# Patient Record
Sex: MALE | Race: WHITE | Employment: OTHER | URBAN - METROPOLITAN AREA
[De-identification: names, ages, dates, MRNs, and addresses within clinical notes are randomized per-mention and may not be internally consistent; named-entity substitution may affect disease eponyms.]

---

## 2017-03-07 ENCOUNTER — HOSPITAL ENCOUNTER (OUTPATIENT)
Dept: CT IMAGING | Age: 57
Discharge: HOME | End: 2017-03-07
Attending: INTERNAL MEDICINE
Payer: COMMERCIAL

## 2017-03-07 DIAGNOSIS — R91.1 PULMONARY NODULE: ICD-10-CM

## 2017-03-07 PROCEDURE — 71250 CT THORAX DX C-: CPT

## 2017-04-18 ENCOUNTER — TELEPHONE (OUTPATIENT)
Dept: GASTROENTEROLOGY | Facility: CLINIC | Age: 57
End: 2017-04-18

## 2017-04-18 RX ORDER — OMEPRAZOLE 40 MG/1
40 CAPSULE, DELAYED RELEASE ORAL DAILY
Qty: 30 CAPSULE | Refills: 0 | Status: SHIPPED | OUTPATIENT
Start: 2017-04-18 | End: 2017-04-27

## 2017-04-18 NOTE — TELEPHONE ENCOUNTER
Last OV 3/17/2016  Last refill 3/17/2016    Pt has appt scheduled on 4/27/17 @10:45am.   Please advise, thank you.

## 2017-04-18 NOTE — TELEPHONE ENCOUNTER
Current Outpatient Prescriptions:                                      Omeprazole 40 MG Oral Capsule Delayed Release Take 1 capsule (40 mg total) by mouth daily.  Disp: 30 capsule Rfl: 11                         Pt is out of med -needs refill until appt o

## 2017-04-18 NOTE — TELEPHONE ENCOUNTER
Nursing: I have refilled omeprazole 40 mg daily for 1 month (#30, 0 refills). The patient may have additional refills after he sees Dr. Sonia Sawyer for his annual visit.

## 2017-04-27 ENCOUNTER — OFFICE VISIT (OUTPATIENT)
Dept: GASTROENTEROLOGY | Facility: CLINIC | Age: 57
End: 2017-04-27

## 2017-04-27 VITALS
SYSTOLIC BLOOD PRESSURE: 120 MMHG | TEMPERATURE: 98 F | HEART RATE: 80 BPM | HEIGHT: 69 IN | BODY MASS INDEX: 29.92 KG/M2 | WEIGHT: 202 LBS | DIASTOLIC BLOOD PRESSURE: 84 MMHG | RESPIRATION RATE: 16 BRPM

## 2017-04-27 DIAGNOSIS — Z86.010 HISTORY OF COLON POLYPS: ICD-10-CM

## 2017-04-27 DIAGNOSIS — K21.9 GASTROESOPHAGEAL REFLUX DISEASE WITHOUT ESOPHAGITIS: Primary | ICD-10-CM

## 2017-04-27 PROCEDURE — 99213 OFFICE O/P EST LOW 20 MIN: CPT | Performed by: INTERNAL MEDICINE

## 2017-04-27 PROCEDURE — 99212 OFFICE O/P EST SF 10 MIN: CPT | Performed by: INTERNAL MEDICINE

## 2017-04-27 RX ORDER — OMEPRAZOLE 40 MG/1
40 CAPSULE, DELAYED RELEASE ORAL DAILY
Qty: 30 CAPSULE | Refills: 11 | Status: SHIPPED | OUTPATIENT
Start: 2017-04-27 | End: 2018-05-25

## 2017-04-27 NOTE — PROGRESS NOTES
HPI:    Patient ID: Leoncio Mcfadden is a 60year old male. HPI  Heidy Never returns in follow-up. He was last seen in March 2016. As per previous notes he has a history of endoscopy negative gastroesophageal reflux (2003) controlled with PPI therapy.   He als above    Wt Readings from Last 6 Encounters:  04/27/17 : 202 lb (91.627 kg)  12/17/16 : 196 lb (88.905 kg)  06/27/16 : 202 lb (91.627 kg)  03/17/16 : 207 lb 9.6 oz (94.167 kg)  01/19/15 : 204 lb 9.6 oz (92.806 kg)  11/19/12 : 202 lb 12.8 oz (91.989 kg) Lymphadenopathy:     He has no cervical adenopathy. Neurological: He is alert and oriented to person, place, and time. Psychiatric: He has a normal mood and affect. His behavior is normal.   Nursing note and vitals reviewed.       PROCEDURE: US ABDOME He will continue calcium/magnesium supplementation. He may take Pepcid AC as needed. Follow-up office visit in 1 year or sooner if issues arise. 2. History of colon polyps  The patient is up-to-date with colonoscopic surveillance.   He is due for a col

## 2017-04-28 NOTE — PATIENT INSTRUCTIONS
1.  Continue omeprazole. 2.  Follow-up office visit in 1 year. 3.  You are due for a colonoscopy in December 2018.

## 2017-05-02 ENCOUNTER — OFFICE VISIT (OUTPATIENT)
Dept: PULMONOLOGY | Facility: CLINIC | Age: 57
End: 2017-05-02

## 2017-05-02 VITALS
OXYGEN SATURATION: 99 % | BODY MASS INDEX: 29.47 KG/M2 | RESPIRATION RATE: 18 BRPM | DIASTOLIC BLOOD PRESSURE: 80 MMHG | HEIGHT: 69 IN | WEIGHT: 199 LBS | HEART RATE: 69 BPM | SYSTOLIC BLOOD PRESSURE: 126 MMHG

## 2017-05-02 DIAGNOSIS — R91.8 PULMONARY NODULES: Primary | ICD-10-CM

## 2017-05-02 PROCEDURE — 99212 OFFICE O/P EST SF 10 MIN: CPT | Performed by: INTERNAL MEDICINE

## 2017-05-02 PROCEDURE — 99243 OFF/OP CNSLTJ NEW/EST LOW 30: CPT | Performed by: INTERNAL MEDICINE

## 2017-05-02 NOTE — PROGRESS NOTES
Dear Sheryl Fleischer:           As you know, Enrico Flores is a 57-year-old male who I am now evaluating for pulmonary nodules. HISTORY OF PRESENT ILLNESS: The patient is a lifetime non-smoker. He worked as a  for over 20 years.   He did have some smoke inha without hepatosplenomegaly and no mass appreciable. Extremities without clubbing cyanosis nor edema. Neurologic grossly intact with symmetric tone and strength and reflex.     LABORATORY: CT scan of the chest March 7, 2017– multiple tiny pulmonary nodules 3

## 2017-06-05 ENCOUNTER — HOSPITAL ENCOUNTER (OUTPATIENT)
Age: 57
Discharge: HOME | End: 2017-06-05
Payer: COMMERCIAL

## 2017-06-05 VITALS
TEMPERATURE: 98 F | WEIGHT: 195 LBS | DIASTOLIC BLOOD PRESSURE: 81 MMHG | HEIGHT: 69 IN | RESPIRATION RATE: 15 BRPM | HEART RATE: 75 BPM | OXYGEN SATURATION: 99 % | SYSTOLIC BLOOD PRESSURE: 132 MMHG | BODY MASS INDEX: 28.88 KG/M2

## 2017-06-05 DIAGNOSIS — S60.459A FINGER, SUPERFICIAL FOREIGN BODY (SPLINTER), INITIAL ENCOUNTER: Primary | ICD-10-CM

## 2017-06-05 PROCEDURE — 99214 OFFICE O/P EST MOD 30 MIN: CPT

## 2017-06-05 PROCEDURE — 99213 OFFICE O/P EST LOW 20 MIN: CPT

## 2017-06-05 RX ORDER — CEPHALEXIN 500 MG/1
500 CAPSULE ORAL 3 TIMES DAILY
Qty: 30 CAPSULE | Refills: 0 | Status: SHIPPED | OUTPATIENT
Start: 2017-06-05 | End: 2017-06-15

## 2017-06-05 NOTE — ED PROVIDER NOTES
Patient Seen in: [address]    History   Patient presents with:  Finger Pain    Stated Complaint: RT THUMB SWELLING     HPI    Patient is a 53-year-old male who presents for evaluation of possible right thumb retained sli • Other[other] [OTHER] Maternal Grandmother    • Other[other] [OTHER] Maternal Grandfather    • Other[other] [OTHER] Paternal Grandmother    • Other[other] [OTHER] Paternal Grandfather          Smoking Status: Never Smoker                      Smokeless St Vitals stable, patient appears nontoxic. Physical exam as above. I do not feel it is necessary to do a digital block and wound exploration at this time.   Had a lengthy discussion with patient regarding foreign bodies in the skin and said it will likely w

## 2017-06-05 NOTE — ED INITIAL ASSESSMENT (HPI)
PATIENT ARRIVED AMBULATORY TO ROOM ONE. PATIENT STATES HE GOT A \"WOODEN SLIVER\" TO THE RIGHT THUMB. PATIENT STATES \"I PULLED IT OUT BUT I FEEL LIKE THERE IS STILL A LITTLE SOMETHING IN THERE THAT I CANT GET OUT\" SMALL PUNCTURE WOUND NOTED TO THUMB.  NO

## 2017-07-05 ENCOUNTER — LAB REQUISITION (OUTPATIENT)
Dept: LAB | Facility: HOSPITAL | Age: 57
End: 2017-07-05
Payer: COMMERCIAL

## 2017-07-05 DIAGNOSIS — L82.0 INFLAMED SEBORRHEIC KERATOSIS: ICD-10-CM

## 2017-07-05 DIAGNOSIS — L57.0 ACTINIC KERATOSIS: ICD-10-CM

## 2017-07-05 DIAGNOSIS — D48.5 NEOPLASM OF UNCERTAIN BEHAVIOR OF SKIN: ICD-10-CM

## 2017-07-05 DIAGNOSIS — R23.9 SKIN CHANGE: ICD-10-CM

## 2017-07-05 PROCEDURE — 88305 TISSUE EXAM BY PATHOLOGIST: CPT | Performed by: PLASTIC SURGERY

## 2017-08-06 ENCOUNTER — APPOINTMENT (OUTPATIENT)
Dept: GENERAL RADIOLOGY | Age: 57
End: 2017-08-06
Attending: NURSE PRACTITIONER
Payer: COMMERCIAL

## 2017-08-06 ENCOUNTER — HOSPITAL ENCOUNTER (OUTPATIENT)
Age: 57
Discharge: HOME | End: 2017-08-06
Payer: COMMERCIAL

## 2017-08-06 VITALS
HEART RATE: 84 BPM | OXYGEN SATURATION: 98 % | DIASTOLIC BLOOD PRESSURE: 79 MMHG | TEMPERATURE: 98 F | RESPIRATION RATE: 18 BRPM | BODY MASS INDEX: 28.88 KG/M2 | WEIGHT: 195 LBS | HEIGHT: 69 IN | SYSTOLIC BLOOD PRESSURE: 124 MMHG

## 2017-08-06 DIAGNOSIS — S05.91XA RIGHT EYE INJURY, INITIAL ENCOUNTER: Primary | ICD-10-CM

## 2017-08-06 PROCEDURE — 99214 OFFICE O/P EST MOD 30 MIN: CPT

## 2017-08-06 PROCEDURE — 99213 OFFICE O/P EST LOW 20 MIN: CPT

## 2017-08-06 PROCEDURE — 70200 X-RAY EXAM OF EYE SOCKETS: CPT | Performed by: NURSE PRACTITIONER

## 2017-08-06 RX ORDER — ERYTHROMYCIN 5 MG/G
1 OINTMENT OPHTHALMIC EVERY 6 HOURS
Qty: 1 G | Refills: 0 | Status: SHIPPED | OUTPATIENT
Start: 2017-08-06 | End: 2017-08-13

## 2017-08-06 NOTE — ED PROVIDER NOTES
Patient presents with:   Eye Visual Problem (opthalmic)      HPI:     Lachelle Jenkins is a 60year old male who presents today with a chief complaint of injury to the right eye that occurred prior to arrival.  The patient states he was weed whacking and fe ROS:   Positive for stated complaint: eye injury, photophobia. All other systems reviewed and negative except as noted above. Constitutional and Vital Signs Reviewed. Physical Exam:         Physical Exam:    /79   Pulse 84   Temp 98. is aware of this. He is up-to-date with his tetanus shot.  2 drops of tetracaine were placed in the right eye and the eye was stained. There is no stain uptake.   I attempted to touch the C-shaped dark area that may possibly be a foreign body with a Q-tip

## 2017-08-06 NOTE — ED INITIAL ASSESSMENT (HPI)
REPORTS RIGHT EYE REDNESS, STATES HE WAS WAS USING A WEED [employer]FFERSON THIS AM AND \"SOMETHING\" GRAZED HIS EYE. DENIES FOREIGN BODY SENSATION TO RIGHT EYE AT CURRENT TIME. DENIES BLURRED VISION AND DRAINAGE FROM EYE. REPORTS SLIGHT LIGHT SENSITIVITY.

## 2017-08-25 ENCOUNTER — TELEPHONE (OUTPATIENT)
Dept: PULMONOLOGY | Facility: CLINIC | Age: 57
End: 2017-08-25

## 2017-08-25 NOTE — TELEPHONE ENCOUNTER
Letter sent to patient re: CT Chest from Sept CC. [facility] please process prior auth and contact pt. Thank You.

## 2017-09-08 ENCOUNTER — HOSPITAL ENCOUNTER (OUTPATIENT)
Age: 57
Discharge: HOME | End: 2017-09-08
Payer: COMMERCIAL

## 2017-09-08 VITALS
DIASTOLIC BLOOD PRESSURE: 76 MMHG | TEMPERATURE: 98 F | OXYGEN SATURATION: 98 % | RESPIRATION RATE: 20 BRPM | HEIGHT: 69 IN | BODY MASS INDEX: 28.88 KG/M2 | SYSTOLIC BLOOD PRESSURE: 132 MMHG | WEIGHT: 195 LBS | HEART RATE: 65 BPM

## 2017-09-08 DIAGNOSIS — J01.10 ACUTE NON-RECURRENT FRONTAL SINUSITIS: Primary | ICD-10-CM

## 2017-09-08 PROCEDURE — 99213 OFFICE O/P EST LOW 20 MIN: CPT

## 2017-09-08 PROCEDURE — 99214 OFFICE O/P EST MOD 30 MIN: CPT

## 2017-09-08 RX ORDER — AMOXICILLIN AND CLAVULANATE POTASSIUM 875; 125 MG/1; MG/1
1 TABLET, FILM COATED ORAL 2 TIMES DAILY
Qty: 20 TABLET | Refills: 0 | Status: SHIPPED | OUTPATIENT
Start: 2017-09-08 | End: 2017-09-18

## 2017-09-08 NOTE — ED PROVIDER NOTES
Patient presents with:  Cough/URI      HPI:     Debora Benson is a 59year old male who presents with a chief complaint of frontal sinus congestion and discomfort, thick purulent drainage from the nose, and postnasal drip for the past week.   No history None on file         ROS:   Positive for stated complaint: frontal sinus congestion, sinus discomfort, nasal drainage, post nasal drip, chills. All other systems reviewed and negative except as noted above. Constitutional and Vital Signs Reviewed.

## 2017-10-09 ENCOUNTER — HOSPITAL ENCOUNTER (OUTPATIENT)
Dept: CT IMAGING | Age: 57
Discharge: HOME | End: 2017-10-09
Attending: INTERNAL MEDICINE
Payer: COMMERCIAL

## 2017-10-09 DIAGNOSIS — R91.8 PULMONARY NODULES: ICD-10-CM

## 2017-10-09 PROCEDURE — 71250 CT THORAX DX C-: CPT | Performed by: INTERNAL MEDICINE

## 2017-10-20 ENCOUNTER — TELEPHONE (OUTPATIENT)
Dept: PULMONOLOGY | Facility: CLINIC | Age: 57
End: 2017-10-20

## 2017-10-20 DIAGNOSIS — R91.1 PULMONARY NODULE: Primary | ICD-10-CM

## 2017-10-20 NOTE — TELEPHONE ENCOUNTER
----- Message from Val Delgadillo MD sent at 10/15/2017 11:10 AM CDT -----  RN, I spoke with the patient regarding the results of his CT scan the chest.  Please add to the calendar a repeat CT scan of the chest at the 6 month interval.

## 2018-02-12 ENCOUNTER — OFFICE VISIT (OUTPATIENT)
Dept: GASTROENTEROLOGY | Facility: CLINIC | Age: 58
End: 2018-02-12

## 2018-02-12 VITALS
HEIGHT: 68 IN | WEIGHT: 199 LBS | BODY MASS INDEX: 30.16 KG/M2 | DIASTOLIC BLOOD PRESSURE: 78 MMHG | HEART RATE: 68 BPM | SYSTOLIC BLOOD PRESSURE: 120 MMHG

## 2018-02-12 DIAGNOSIS — Z86.010 HISTORY OF COLON POLYPS: ICD-10-CM

## 2018-02-12 DIAGNOSIS — K21.9 GASTROESOPHAGEAL REFLUX DISEASE WITHOUT ESOPHAGITIS: Primary | ICD-10-CM

## 2018-02-12 PROCEDURE — 99212 OFFICE O/P EST SF 10 MIN: CPT | Performed by: INTERNAL MEDICINE

## 2018-02-12 PROCEDURE — 99213 OFFICE O/P EST LOW 20 MIN: CPT | Performed by: INTERNAL MEDICINE

## 2018-02-14 ENCOUNTER — APPOINTMENT (OUTPATIENT)
Dept: CT IMAGING | Facility: HOSPITAL | Age: 58
End: 2018-02-14
Attending: EMERGENCY MEDICINE
Payer: COMMERCIAL

## 2018-02-14 ENCOUNTER — HOSPITAL ENCOUNTER (OUTPATIENT)
Facility: HOSPITAL | Age: 58
Setting detail: OBSERVATION
Discharge: HOME | End: 2018-02-15
Attending: EMERGENCY MEDICINE | Admitting: HOSPITALIST
Payer: COMMERCIAL

## 2018-02-14 DIAGNOSIS — R10.9 ABDOMINAL PAIN, ACUTE: Primary | ICD-10-CM

## 2018-02-14 LAB
ALBUMIN SERPL BCP-MCNC: 4 G/DL (ref 3.5–4.8)
ALP SERPL-CCNC: 53 U/L (ref 32–100)
ALT SERPL-CCNC: 38 U/L (ref 17–63)
ANION GAP SERPL CALC-SCNC: 9 MMOL/L (ref 0–18)
AST SERPL-CCNC: 30 U/L (ref 15–41)
BASOPHILS # BLD: 0 K/UL (ref 0–0.2)
BASOPHILS NFR BLD: 1 %
BILIRUB DIRECT SERPL-MCNC: 0.1 MG/DL (ref 0–0.2)
BILIRUB SERPL-MCNC: 0.7 MG/DL (ref 0.3–1.2)
BILIRUB UR QL: NEGATIVE
BUN SERPL-MCNC: 15 MG/DL (ref 8–20)
BUN/CREAT SERPL: 13.9 (ref 10–20)
CALCIUM SERPL-MCNC: 9 MG/DL (ref 8.5–10.5)
CHLORIDE SERPL-SCNC: 108 MMOL/L (ref 95–110)
CLARITY UR: CLEAR
CO2 SERPL-SCNC: 23 MMOL/L (ref 22–32)
COLOR UR: YELLOW
CREAT SERPL-MCNC: 1.08 MG/DL (ref 0.5–1.5)
EOSINOPHIL # BLD: 0.1 K/UL (ref 0–0.7)
EOSINOPHIL NFR BLD: 2 %
ERYTHROCYTE [DISTWIDTH] IN BLOOD BY AUTOMATED COUNT: 13.3 % (ref 11–15)
GLUCOSE SERPL-MCNC: 109 MG/DL (ref 70–99)
GLUCOSE UR-MCNC: NEGATIVE MG/DL
HCT VFR BLD AUTO: 44.2 % (ref 41–52)
HGB BLD-MCNC: 15.4 G/DL (ref 13.5–17.5)
HGB UR QL STRIP.AUTO: NEGATIVE
KETONES UR-MCNC: NEGATIVE MG/DL
LEUKOCYTE ESTERASE UR QL STRIP.AUTO: NEGATIVE
LYMPHOCYTES # BLD: 2.2 K/UL (ref 1–4)
LYMPHOCYTES NFR BLD: 42 %
MCH RBC QN AUTO: 30.9 PG (ref 27–32)
MCHC RBC AUTO-ENTMCNC: 34.8 G/DL (ref 32–37)
MCV RBC AUTO: 88.9 FL (ref 80–100)
MONOCYTES # BLD: 0.4 K/UL (ref 0–1)
MONOCYTES NFR BLD: 7 %
NEUTROPHILS # BLD AUTO: 2.6 K/UL (ref 1.8–7.7)
NEUTROPHILS NFR BLD: 49 %
NITRITE UR QL STRIP.AUTO: NEGATIVE
OSMOLALITY UR CALC.SUM OF ELEC: 291 MOSM/KG (ref 275–295)
PH UR: 6 [PH] (ref 5–8)
PLATELET # BLD AUTO: 235 K/UL (ref 140–400)
PMV BLD AUTO: 7.7 FL (ref 7.4–10.3)
POTASSIUM SERPL-SCNC: 3.5 MMOL/L (ref 3.3–5.1)
PROT SERPL-MCNC: 6.7 G/DL (ref 5.9–8.4)
PROT UR-MCNC: NEGATIVE MG/DL
RBC # BLD AUTO: 4.97 M/UL (ref 4.5–5.9)
SODIUM SERPL-SCNC: 140 MMOL/L (ref 136–144)
SP GR UR STRIP: 1.02 (ref 1–1.03)
UROBILINOGEN UR STRIP-ACNC: <2
VIT C UR-MCNC: NEGATIVE MG/DL
WBC # BLD AUTO: 5.3 K/UL (ref 4–11)

## 2018-02-14 PROCEDURE — 74176 CT ABD & PELVIS W/O CONTRAST: CPT | Performed by: EMERGENCY MEDICINE

## 2018-02-14 PROCEDURE — 99219 INITIAL OBSERVATION CARE,LEVL II: CPT | Performed by: HOSPITALIST

## 2018-02-14 RX ORDER — MORPHINE SULFATE 2 MG/ML
2 INJECTION, SOLUTION INTRAMUSCULAR; INTRAVENOUS EVERY 2 HOUR PRN
Status: DISCONTINUED | OUTPATIENT
Start: 2018-02-14 | End: 2018-02-15

## 2018-02-14 RX ORDER — KETOROLAC TROMETHAMINE 30 MG/ML
30 INJECTION, SOLUTION INTRAMUSCULAR; INTRAVENOUS ONCE
Status: COMPLETED | OUTPATIENT
Start: 2018-02-14 | End: 2018-02-14

## 2018-02-14 RX ORDER — KETOROLAC TROMETHAMINE 15 MG/ML
15 INJECTION, SOLUTION INTRAMUSCULAR; INTRAVENOUS EVERY 6 HOURS PRN
Status: DISCONTINUED | OUTPATIENT
Start: 2018-02-14 | End: 2018-02-15

## 2018-02-14 RX ORDER — HYDROCODONE BITARTRATE AND ACETAMINOPHEN 5; 325 MG/1; MG/1
2 TABLET ORAL EVERY 4 HOURS PRN
Status: DISCONTINUED | OUTPATIENT
Start: 2018-02-14 | End: 2018-02-15

## 2018-02-14 RX ORDER — ACETAMINOPHEN 325 MG/1
650 TABLET ORAL EVERY 6 HOURS PRN
Status: DISCONTINUED | OUTPATIENT
Start: 2018-02-14 | End: 2018-02-15

## 2018-02-14 RX ORDER — MORPHINE SULFATE 4 MG/ML
4 INJECTION, SOLUTION INTRAMUSCULAR; INTRAVENOUS EVERY 2 HOUR PRN
Status: DISCONTINUED | OUTPATIENT
Start: 2018-02-14 | End: 2018-02-15

## 2018-02-14 RX ORDER — CYCLOBENZAPRINE HCL 10 MG
10 TABLET ORAL 3 TIMES DAILY PRN
Status: DISCONTINUED | OUTPATIENT
Start: 2018-02-14 | End: 2018-02-15

## 2018-02-14 RX ORDER — ACETAMINOPHEN 325 MG/1
650 TABLET ORAL EVERY 4 HOURS PRN
Status: DISCONTINUED | OUTPATIENT
Start: 2018-02-14 | End: 2018-02-15

## 2018-02-14 RX ORDER — MORPHINE SULFATE 2 MG/ML
1 INJECTION, SOLUTION INTRAMUSCULAR; INTRAVENOUS EVERY 2 HOUR PRN
Status: DISCONTINUED | OUTPATIENT
Start: 2018-02-14 | End: 2018-02-15

## 2018-02-14 RX ORDER — HYDROCODONE BITARTRATE AND ACETAMINOPHEN 5; 325 MG/1; MG/1
1 TABLET ORAL EVERY 4 HOURS PRN
Status: DISCONTINUED | OUTPATIENT
Start: 2018-02-14 | End: 2018-02-15

## 2018-02-14 RX ORDER — MORPHINE SULFATE 4 MG/ML
4 INJECTION, SOLUTION INTRAMUSCULAR; INTRAVENOUS ONCE
Status: COMPLETED | OUTPATIENT
Start: 2018-02-14 | End: 2018-02-14

## 2018-02-14 RX ORDER — KETOROLAC TROMETHAMINE 30 MG/ML
30 INJECTION, SOLUTION INTRAMUSCULAR; INTRAVENOUS EVERY 6 HOURS PRN
Status: DISCONTINUED | OUTPATIENT
Start: 2018-02-14 | End: 2018-02-15

## 2018-02-14 RX ORDER — POTASSIUM CHLORIDE 20 MEQ/1
40 TABLET, EXTENDED RELEASE ORAL EVERY 4 HOURS
Status: COMPLETED | OUTPATIENT
Start: 2018-02-14 | End: 2018-02-15

## 2018-02-14 RX ORDER — ONDANSETRON 2 MG/ML
4 INJECTION INTRAMUSCULAR; INTRAVENOUS ONCE
Status: COMPLETED | OUTPATIENT
Start: 2018-02-14 | End: 2018-02-14

## 2018-02-14 RX ORDER — SODIUM CHLORIDE 0.9 % (FLUSH) 0.9 %
3 SYRINGE (ML) INJECTION AS NEEDED
Status: DISCONTINUED | OUTPATIENT
Start: 2018-02-14 | End: 2018-02-15

## 2018-02-14 RX ORDER — ONDANSETRON 2 MG/ML
4 INJECTION INTRAMUSCULAR; INTRAVENOUS EVERY 6 HOURS PRN
Status: DISCONTINUED | OUTPATIENT
Start: 2018-02-14 | End: 2018-02-15

## 2018-02-14 RX ORDER — MORPHINE SULFATE 2 MG/ML
2 INJECTION, SOLUTION INTRAMUSCULAR; INTRAVENOUS ONCE
Status: COMPLETED | OUTPATIENT
Start: 2018-02-14 | End: 2018-02-14

## 2018-02-14 NOTE — ED INITIAL ASSESSMENT (HPI)
approx 1 hour prior to arrival while in the shower developed grabbing type pain \"muscle\" to bilateral lower abdomen with nausea. sts did have one loose stool. Denies vomiting or fever. Pain continues at this time.

## 2018-02-14 NOTE — ED PROVIDER NOTES
Patient Seen in: [facility] AND [facility] Emergency Department    History   Patient presents with:  Abdomen/Flank Pain (GI/)    Stated Complaint:     HPI    Patient is a 52-year-old male presents with sudden onset severe abdominal pain.   Patient describes it distension or masses. BS normal. TTP in RUQ, periumbilical area and LUQ with guarding.  No rebound  Extremities: FROM of all extremities, no cyanosis/clubbing/edema  Neuro: CN intact, normal speech,  5/5 motor strength in all extremities, no focal deficits [place] for observational stay and continued pain meds. Dr Zev Joshua notified but is going out of town. Requests consult go to another surgeon. [facility] uses [facility], will consult with another surgeon.  D/w dr Lico Guajardo, recommends toradol and will

## 2018-02-14 NOTE — H&P
[facility]    PATIENT'S NAME: Rainer Foote   ATTENDING PHYSICIAN: Minda Harada, MD   PATIENT ACCOUNT#:   [id number]    LOCATION:  [place] [address]  MEDICAL RECORD #:   [id number]       YOB: 1960  ADMISSION DATE:       02/14 person. Moderate distress. VITAL SIGNS:  Temperature 97.3, pulse 54, respiratory rate 22, blood pressure 131/81, pulse ox 100% on room air. HEENT:  Atraumatic. Oropharynx clear. Moist mucous membranes. Normal hard and soft palate.    NECK:  Trachea m

## 2018-02-15 VITALS
HEART RATE: 58 BPM | OXYGEN SATURATION: 96 % | DIASTOLIC BLOOD PRESSURE: 76 MMHG | HEIGHT: 69 IN | SYSTOLIC BLOOD PRESSURE: 115 MMHG | RESPIRATION RATE: 18 BRPM | BODY MASS INDEX: 28.14 KG/M2 | TEMPERATURE: 98 F | WEIGHT: 190 LBS

## 2018-02-15 LAB
ANION GAP SERPL CALC-SCNC: 4 MMOL/L (ref 0–18)
BASOPHILS # BLD: 0 K/UL (ref 0–0.2)
BASOPHILS NFR BLD: 0 %
BUN SERPL-MCNC: 11 MG/DL (ref 8–20)
BUN/CREAT SERPL: 11.6 (ref 10–20)
CALCIUM SERPL-MCNC: 8.5 MG/DL (ref 8.5–10.5)
CHLORIDE SERPL-SCNC: 113 MMOL/L (ref 95–110)
CO2 SERPL-SCNC: 23 MMOL/L (ref 22–32)
CREAT SERPL-MCNC: 0.95 MG/DL (ref 0.5–1.5)
EOSINOPHIL # BLD: 0.2 K/UL (ref 0–0.7)
EOSINOPHIL NFR BLD: 2 %
ERYTHROCYTE [DISTWIDTH] IN BLOOD BY AUTOMATED COUNT: 13.5 % (ref 11–15)
GLUCOSE SERPL-MCNC: 105 MG/DL (ref 70–99)
HCT VFR BLD AUTO: 39.8 % (ref 41–52)
HGB BLD-MCNC: 13.9 G/DL (ref 13.5–17.5)
LYMPHOCYTES # BLD: 3 K/UL (ref 1–4)
LYMPHOCYTES NFR BLD: 35 %
MCH RBC QN AUTO: 31.4 PG (ref 27–32)
MCHC RBC AUTO-ENTMCNC: 35 G/DL (ref 32–37)
MCV RBC AUTO: 89.7 FL (ref 80–100)
MONOCYTES # BLD: 0.6 K/UL (ref 0–1)
MONOCYTES NFR BLD: 7 %
NEUTROPHILS # BLD AUTO: 4.7 K/UL (ref 1.8–7.7)
NEUTROPHILS NFR BLD: 55 %
OSMOLALITY UR CALC.SUM OF ELEC: 290 MOSM/KG (ref 275–295)
PLATELET # BLD AUTO: 209 K/UL (ref 140–400)
PMV BLD AUTO: 7.9 FL (ref 7.4–10.3)
POTASSIUM SERPL-SCNC: 4.5 MMOL/L (ref 3.3–5.1)
POTASSIUM SERPL-SCNC: 4.5 MMOL/L (ref 3.3–5.1)
RBC # BLD AUTO: 4.44 M/UL (ref 4.5–5.9)
SODIUM SERPL-SCNC: 140 MMOL/L (ref 136–144)
WBC # BLD AUTO: 8.4 K/UL (ref 4–11)

## 2018-02-15 PROCEDURE — 99217 OBSERVATION CARE DISCHARGE: CPT | Performed by: HOSPITALIST

## 2018-02-15 NOTE — PROGRESS NOTES
[facility]    Progress Note    Brionna Brunson Patient Status:  Observation    1960 MRN [id number]   Location [facility] 4W/SW/SE Attending Ji Brennan MD   Hosp Day # 0 PCP Shiraz Molina MD       Subjective:   Adalid Nascmiento etiology of the patient's symptoms is unclear from this study. 2. Probable bilateral extrarenal pelves versus parapelvic cysts. 3. Subcentimeter hypodense liver lesions, incompletely characterized, but statistically benign cysts or hemangiomas.  4. Distal c

## 2018-02-15 NOTE — CONSULTS
[place] [facility] - [facility]    Report of Consultation    Guille Muhammad Patient Status:  Observation    1960 MRN [id number]   Location [facility] 4W/SW/SE Attending Mariel Muñoz MD   Hosp Day # 0 PCP Connie Blackburn MD     Date of Admi that he has never smoked. He has never used smokeless tobacco. He reports that he drinks alcohol. He reports that he does not use drugs.     Allergies:  No Known Allergies    Medications:    Current Facility-Administered Medications:   •  Normal Saline Flus intact  Abdomen: Soft nondistended nontender. No palpable discrete masses present. No tenderness of the chest wall, lateral chest wall or ribs. No tenderness of the left or right flank. No palpable masses present. No ecchymosis present.   Results:    L Follow along with you. No acute surgical intervention at this time. Discussed in detail with patient as well as his wife.     Time spent in direct patient contact and decision making as well as counseling/coordination of care:    83143- 80 min    ARMAAN

## 2018-02-15 NOTE — DISCHARGE SUMMARY
[facility]    Discharge Summary    Kristie Calles Patient Status:  Observation    1960 MRN [id number]   Location [facility] 4W/SW/SE Attending Irais Sheppard MD   Hosp Day # 0 PCP Lee Snell MD     Date of Admiss of Present Illness:   Per Dr. Glo Rodgers  The patient is a 20-year-old  male who was taking a shower today, getting ready to go out, when he bent over to dry his legs and had sudden sharp pain in bilateral upper abdomen/abdominal wall area radiating Cpdr      Take 1 capsule (40 mg total) by mouth daily. Quantity:  30 capsule  Refills:  11     RA FISH OIL 1000 MG Caps      Take 1,000 capsules by mouth daily. Refills:  0     TURMERIC CURCUMIN OR      Take 1 tablet by mouth daily.    Refills:  0     v

## 2018-02-16 NOTE — PATIENT INSTRUCTIONS
1.  Continue omeprazole. 2.  [facility]. 3.  Advise weight loss. 4.  You are due for a colonoscopy in December 2018.

## 2018-02-16 NOTE — PROGRESS NOTES
HPI:    Patient ID: Salvador Villalta is a 59year old male. HPI  Richie Grissom returns in follow-up. He was last seen in April 2017. As per previous notes he has a history of endoscopy negative gastroesophageal reflux (2003) controlled with PPI therapy.   He mouth daily. Disp:  Rfl:    Omeprazole 40 MG Oral Capsule Delayed Release Take 1 capsule (40 mg total) by mouth daily. Disp: 30 capsule Rfl: 11   Cholecalciferol (VITAMIN D3) 49597 UNITS Oral Cap Take 1 capsule by mouth daily.    Disp:  Rfl:    Ascorbic A ASSESSMENT/PLAN:   1. Gastroesophageal reflux disease without esophagitis  Prasanth Orona has a history of endoscopy negative gastroesophageal reflux. Symptoms for the most part are under good control. I have reinforced dietary and lifestyle modification.   Weight

## 2018-02-19 ENCOUNTER — TELEPHONE (OUTPATIENT)
Dept: GASTROENTEROLOGY | Facility: CLINIC | Age: 58
End: 2018-02-19

## 2018-02-19 NOTE — TELEPHONE ENCOUNTER
Current Outpatient Prescriptions:  Omeprazole 40 MG Oral Capsule Delayed Release Take 1 capsule (40 mg total) by mouth daily.  Disp: 30 capsule Rfl: 11     PA request pls call [phone] plan limit 90 in 1 yr exceeded Pt ID# [id number]

## 2018-02-19 NOTE — TELEPHONE ENCOUNTER
Spoke to [employer] from [facility] Omeprazole 40mg daily was approved for 36 months. Auth #[id number]. Spoke to [employer]) she will get med prepared and call pt to .

## 2018-05-25 RX ORDER — OMEPRAZOLE 40 MG/1
CAPSULE, DELAYED RELEASE ORAL
Qty: 30 CAPSULE | Refills: 0 | OUTPATIENT
Start: 2018-05-25

## 2018-05-25 RX ORDER — OMEPRAZOLE 40 MG/1
40 CAPSULE, DELAYED RELEASE ORAL DAILY
Qty: 90 CAPSULE | Refills: 3 | Status: ON HOLD | OUTPATIENT
Start: 2018-05-25 | End: 2018-12-31

## 2018-05-25 NOTE — TELEPHONE ENCOUNTER
Pending Prescriptions Disp Refills    OMEPRAZOLE 40 MG Oral Capsule Delayed Release [Pharmacy Med Name: OMEPRAZOLE 40MG CAPSULES] 30 capsule 0     Sig: TAKE 1 CAPSULE(40 MG) BY MOUTH DAILY      OMEPRAZOLE 40 MG Oral Capsule Delayed Release [Pharmacy Med

## 2018-08-28 ENCOUNTER — TELEPHONE (OUTPATIENT)
Dept: PULMONOLOGY | Facility: CLINIC | Age: 58
End: 2018-08-28

## 2018-08-28 NOTE — TELEPHONE ENCOUNTER
Gail Mckenzie they did not know to obtain prior auth as referral was closed out. She stts she will work on [place] & contact pt once PA obtained which will likely be today, but rx only good x 1 yr.

## 2018-08-28 NOTE — TELEPHONE ENCOUNTER
Informed pt that CT Chest was due 4/20/18 & of below. Explained he may contact [facility] to schedule CT once he receives auth from [facility] MD will place rx(s) based on his results. Reassured him that he may contact us if we may be of further assistance. He voiced understanding. Pt stts he has [facility] [employer]'s phone # & will contact them as soon as he hears from Taurus Cobb. Rx added to Chronic Calendar. Msg routed to Managed Care.

## 2018-08-30 ENCOUNTER — HOSPITAL ENCOUNTER (OUTPATIENT)
Dept: CT IMAGING | Facility: HOSPITAL | Age: 58
Discharge: HOME | End: 2018-08-30
Attending: INTERNAL MEDICINE
Payer: COMMERCIAL

## 2018-08-30 DIAGNOSIS — R91.1 PULMONARY NODULE: ICD-10-CM

## 2018-08-30 PROCEDURE — 71250 CT THORAX DX C-: CPT | Performed by: INTERNAL MEDICINE

## 2018-09-04 ENCOUNTER — TELEPHONE (OUTPATIENT)
Dept: PULMONOLOGY | Facility: CLINIC | Age: 58
End: 2018-09-04

## 2018-09-04 DIAGNOSIS — R91.1 PULMONARY NODULE: Primary | ICD-10-CM

## 2018-09-04 NOTE — TELEPHONE ENCOUNTER
----- Message from Howard Montejo MD sent at 8/31/2018  8:13 PM CDT -----  RN, please call the patient to tell him that his pulmonary nodules are stable. This is great news.   Please add to the calendar a repeat super dimension noncontrast CT scan the ch

## 2018-09-04 NOTE — TELEPHONE ENCOUNTER
Spoke with pt. Informed him of [facility] message below. Pt states he was concern of the Radiology comments on his report (Radiology indications) and would like to talk to [facility]. Super dimension noncontrast CT scan placed in C. C for Sept 2019.

## 2018-10-16 ENCOUNTER — TELEPHONE (OUTPATIENT)
Dept: GASTROENTEROLOGY | Facility: CLINIC | Age: 58
End: 2018-10-16

## 2018-10-16 DIAGNOSIS — Z86.010 PERSONAL HISTORY OF COLONIC POLYPS: Primary | ICD-10-CM

## 2018-10-16 NOTE — TELEPHONE ENCOUNTER
Pt states GS told him that he would receive recall letter in mail when he was due to have procedure. Nothing received as of yet. Is he due for procedure? Please call.

## 2018-10-16 NOTE — TELEPHONE ENCOUNTER
Last Procedure:  12/23/2013  Last Diagnosis:   Diminutive colon polyps. 2. Internal hemorrhoids.   Recalled for (years): 5 years  Sedation used previously:  IV  Last Prep Used (if known):  Miralax  Quality of prep (if known):  Good  Anticoagulants/Diabetic

## 2018-10-16 NOTE — TELEPHONE ENCOUNTER
May schedule for a history of polyps with a split dose MiraLAX/Gatorade preparation and either IV sedation or MAC per scheduling.

## 2018-10-26 NOTE — TELEPHONE ENCOUNTER
CBLM to schedule procedure. Please transfer to Jomar Orona at ext [phone] or [phone] for scheduling. Or please transfer to [facility] in GI if unavailable.

## 2018-10-26 NOTE — TELEPHONE ENCOUNTER
Scheduled for:  Colonoscopy - [address]  Provider Name:  Dr. Johnathon Winn  Date:  12/31/18  Location:  [facility]  Sedation:  IV  Time:  10:00 am (pt is aware to arrive at 9:00 am)  Prep:  Miralax/Gatorade, Prep instructions were given to pt over the phone, pt verbalized under

## 2018-12-31 ENCOUNTER — TELEPHONE (OUTPATIENT)
Dept: GASTROENTEROLOGY | Facility: CLINIC | Age: 58
End: 2018-12-31

## 2018-12-31 ENCOUNTER — HOSPITAL ENCOUNTER (OUTPATIENT)
Facility: HOSPITAL | Age: 58
Setting detail: HOSPITAL OUTPATIENT SURGERY
Discharge: HOME | End: 2018-12-31
Attending: INTERNAL MEDICINE | Admitting: INTERNAL MEDICINE
Payer: COMMERCIAL

## 2018-12-31 DIAGNOSIS — Z86.010 PERSONAL HISTORY OF COLONIC POLYPS: ICD-10-CM

## 2018-12-31 PROCEDURE — 0DJD8ZZ INSPECTION OF LOWER INTESTINAL TRACT, VIA NATURAL OR ARTIFICIAL OPENING ENDOSCOPIC: ICD-10-PCS | Performed by: INTERNAL MEDICINE

## 2018-12-31 PROCEDURE — 45378 DIAGNOSTIC COLONOSCOPY: CPT | Performed by: INTERNAL MEDICINE

## 2018-12-31 PROCEDURE — G0500 MOD SEDAT ENDO SERVICE >5YRS: HCPCS | Performed by: INTERNAL MEDICINE

## 2018-12-31 RX ORDER — MIDAZOLAM HYDROCHLORIDE 1 MG/ML
INJECTION INTRAMUSCULAR; INTRAVENOUS
Status: DISCONTINUED | OUTPATIENT
Start: 2018-12-31 | End: 2018-12-31

## 2018-12-31 RX ORDER — OMEPRAZOLE 40 MG/1
40 CAPSULE, DELAYED RELEASE ORAL DAILY
Qty: 90 CAPSULE | Refills: 3 | Status: SHIPPED | OUTPATIENT
Start: 2018-12-31 | End: 2020-01-02

## 2018-12-31 RX ORDER — SODIUM CHLORIDE 0.9 % (FLUSH) 0.9 %
10 SYRINGE (ML) INJECTION AS NEEDED
Status: DISCONTINUED | OUTPATIENT
Start: 2018-12-31 | End: 2018-12-31

## 2018-12-31 RX ORDER — SODIUM CHLORIDE, SODIUM LACTATE, POTASSIUM CHLORIDE, CALCIUM CHLORIDE 600; 310; 30; 20 MG/100ML; MG/100ML; MG/100ML; MG/100ML
INJECTION, SOLUTION INTRAVENOUS CONTINUOUS
Status: DISCONTINUED | OUTPATIENT
Start: 2018-12-31 | End: 2018-12-31

## 2018-12-31 RX ORDER — MIDAZOLAM HYDROCHLORIDE 1 MG/ML
1 INJECTION INTRAMUSCULAR; INTRAVENOUS EVERY 5 MIN PRN
Status: DISCONTINUED | OUTPATIENT
Start: 2018-12-31 | End: 2018-12-31

## 2018-12-31 NOTE — H&P
History & Physical Examination    Patient Name: Moises Fleming  MRN: [id number]  CSN: [id number]  YOB: 1960    Diagnosis: Personal history of adenomatous colon polyps, colorectal cancer screening        Medications Prior to Admission:  marquez Intravenous Q5 Min PRN   fentaNYL citrate (SUBLIMAZE) 0.05 MG/ML injection 25 mcg 25 mcg Intravenous Q5 Min PRN       Allergies: No Known Allergies    Past Medical History:   Diagnosis Date   • Esophageal reflux    • Hearing impairment     Ringing in ears

## 2018-12-31 NOTE — OPERATIVE REPORT
[facility] Endoscopy Report      Date of Procedure:  12/31/18      Preoperative Diagnosis:  1. Personal history of adenomatous colon polyps  2. Family history of colon cancer  3.   Colorectal cancer screening      Postoperative Diagnosis: ileum    Recommendations: In the absence of any new symptoms or signs, repeat colonoscopy in 5 years.         Regis Parmar MD  12/31/2018

## 2019-01-02 VITALS
WEIGHT: 192 LBS | RESPIRATION RATE: 14 BRPM | BODY MASS INDEX: 28.44 KG/M2 | OXYGEN SATURATION: 98 % | DIASTOLIC BLOOD PRESSURE: 63 MMHG | HEIGHT: 69 IN | SYSTOLIC BLOOD PRESSURE: 120 MMHG | HEART RATE: 65 BPM

## 2019-07-18 ENCOUNTER — HOSPITAL ENCOUNTER (OUTPATIENT)
Age: 59
Discharge: HOME | End: 2019-07-18
Attending: FAMILY MEDICINE
Payer: COMMERCIAL

## 2019-07-18 VITALS
RESPIRATION RATE: 16 BRPM | BODY MASS INDEX: 28.88 KG/M2 | SYSTOLIC BLOOD PRESSURE: 133 MMHG | DIASTOLIC BLOOD PRESSURE: 79 MMHG | HEIGHT: 69 IN | HEART RATE: 65 BPM | OXYGEN SATURATION: 99 % | WEIGHT: 195 LBS | TEMPERATURE: 98 F

## 2019-07-18 DIAGNOSIS — L24.9 IRRITANT CONTACT DERMATITIS, UNSPECIFIED TRIGGER: Primary | ICD-10-CM

## 2019-07-18 PROCEDURE — 99213 OFFICE O/P EST LOW 20 MIN: CPT

## 2019-07-18 PROCEDURE — 99214 OFFICE O/P EST MOD 30 MIN: CPT

## 2019-07-18 NOTE — ED PROVIDER NOTES
Patient Seen in: [facility]    History   Patient presents with:  Rash Skin Problem (integumentary)    Stated Complaint: rash    HPI    Patient is here with a rash over his right lower leg and right forearm.   Was working i Normal range of motion. Neck supple. Cardiovascular: Normal rate and intact distal pulses. Pulmonary/Chest: Effort normal. No respiratory distress. Musculoskeletal: Normal range of motion. He exhibits no edema or deformity.    Skin: Capillary refill t

## 2019-08-16 ENCOUNTER — HOSPITAL ENCOUNTER (OUTPATIENT)
Dept: CT IMAGING | Age: 59
Discharge: HOME | End: 2019-08-16
Attending: INTERNAL MEDICINE
Payer: COMMERCIAL

## 2019-08-16 DIAGNOSIS — R91.1 PULMONARY NODULE: ICD-10-CM

## 2019-08-16 PROCEDURE — 71250 CT THORAX DX C-: CPT | Performed by: INTERNAL MEDICINE

## 2019-09-04 ENCOUNTER — TELEPHONE (OUTPATIENT)
Dept: PULMONOLOGY | Facility: CLINIC | Age: 59
End: 2019-09-04

## 2019-11-14 ENCOUNTER — TELEPHONE (OUTPATIENT)
Dept: PAIN CLINIC | Facility: HOSPITAL | Age: 59
End: 2019-11-14

## 2019-11-14 DIAGNOSIS — M54.40 ACUTE BILATERAL LOW BACK PAIN WITH SCIATICA, SCIATICA LATERALITY UNSPECIFIED: Primary | ICD-10-CM

## 2019-11-14 NOTE — TELEPHONE ENCOUNTER
Pt known to be with low back pain  failed nsaid  Medrol dose pack tried   Will order mri and have pt see me in office

## 2019-11-20 ENCOUNTER — HOSPITAL ENCOUNTER (OUTPATIENT)
Dept: MRI IMAGING | Age: 59
Discharge: HOME | End: 2019-11-20
Attending: ANESTHESIOLOGY
Payer: COMMERCIAL

## 2019-11-20 DIAGNOSIS — M54.40 ACUTE BILATERAL LOW BACK PAIN WITH SCIATICA, SCIATICA LATERALITY UNSPECIFIED: ICD-10-CM

## 2019-11-20 PROCEDURE — 72148 MRI LUMBAR SPINE W/O DYE: CPT | Performed by: ANESTHESIOLOGY

## 2019-11-25 ENCOUNTER — DOCUMENTATION ONLY (OUTPATIENT)
Dept: PAIN CLINIC | Facility: HOSPITAL | Age: 59
End: 2019-11-25

## 2019-11-25 NOTE — PROGRESS NOTES
Procedure code 94116--DIOMKKOG    Scheduled 11/27/2019    Called MARY LOU @ # [phone]    No PA needed     Ref #  [id number]    Order form faxed to the surgery center, confirmation rcv'd

## 2019-12-04 ENCOUNTER — DOCUMENTATION ONLY (OUTPATIENT)
Dept: PAIN CLINIC | Facility: HOSPITAL | Age: 59
End: 2019-12-04

## 2019-12-18 ENCOUNTER — DOCUMENTATION ONLY (OUTPATIENT)
Dept: PAIN CLINIC | Facility: HOSPITAL | Age: 59
End: 2019-12-18

## 2019-12-18 NOTE — PROGRESS NOTES
Procedure code 28530--BANNWJIQB 12/20/19 APPROVED    Called [facility] @ # [phone] @ 1:09 pm     Spoke with Yolanda CROCKER needed     Ref # [id number]    Order faxed over to the surgery center, confirmation rcv;d

## 2020-01-01 ENCOUNTER — TELEPHONE (OUTPATIENT)
Dept: GASTROENTEROLOGY | Facility: CLINIC | Age: 60
End: 2020-01-01

## 2020-01-02 RX ORDER — OMEPRAZOLE 40 MG/1
CAPSULE, DELAYED RELEASE ORAL
Qty: 90 CAPSULE | Refills: 0 | Status: SHIPPED | OUTPATIENT
Start: 2020-01-02 | End: 2020-02-19

## 2020-01-02 NOTE — TELEPHONE ENCOUNTER
LOV 2/12/18  No F/U scheduled  Last fill 12/31/18 by Dr. Lillie Morel  Please advise on refill request.

## 2020-01-02 NOTE — TELEPHONE ENCOUNTER
Please contact Jose Francisco. I have refilled his prescription for 3 months. He should be seen in the office in follow-up at his convenience. Alternatively the prescription could be filled by his PCP. Patient preference.

## 2020-01-02 NOTE — TELEPHONE ENCOUNTER
Pt informed of Dr. Yolanda Leiva' below message OV appt made for   Future Appointments   Date Time Provider Rachael Amador   2/19/2020  1:30 PM April Luu MD [facility]     Patient notified to come to [id number], [place]

## 2020-02-17 ENCOUNTER — PATIENT MESSAGE (OUTPATIENT)
Dept: GASTROENTEROLOGY | Facility: CLINIC | Age: 60
End: 2020-02-17

## 2020-02-17 NOTE — TELEPHONE ENCOUNTER
From: Shakir Geronimo  To: Katherine Rowan MD  Sent: 2/17/2020 12:17 PM CST  Subject: Visit Follow-up Question    Dr. Francois Garcia,    I have recently started high blood pressure medicine and have attached my tracking of my B/P results.  I have an sal

## 2020-02-19 ENCOUNTER — OFFICE VISIT (OUTPATIENT)
Dept: GASTROENTEROLOGY | Facility: CLINIC | Age: 60
End: 2020-02-19
Payer: COMMERCIAL

## 2020-02-19 VITALS
SYSTOLIC BLOOD PRESSURE: 122 MMHG | HEART RATE: 78 BPM | DIASTOLIC BLOOD PRESSURE: 82 MMHG | BODY MASS INDEX: 28.88 KG/M2 | WEIGHT: 195 LBS | HEIGHT: 69 IN

## 2020-02-19 DIAGNOSIS — Z86.010 PERSONAL HISTORY OF COLONIC POLYPS: ICD-10-CM

## 2020-02-19 DIAGNOSIS — K21.9 GASTROESOPHAGEAL REFLUX DISEASE WITHOUT ESOPHAGITIS: Primary | ICD-10-CM

## 2020-02-19 PROCEDURE — 99213 OFFICE O/P EST LOW 20 MIN: CPT | Performed by: INTERNAL MEDICINE

## 2020-02-19 RX ORDER — HYDROCHLOROTHIAZIDE 12.5 MG/1
CAPSULE, GELATIN COATED ORAL
COMMUNITY
Start: 2020-02-04

## 2020-02-19 RX ORDER — OMEPRAZOLE 40 MG/1
CAPSULE, DELAYED RELEASE ORAL
Qty: 30 CAPSULE | Refills: 11 | Status: SHIPPED | OUTPATIENT
Start: 2020-02-19 | End: 2021-02-16

## 2020-02-19 RX ORDER — ACETAMINOPHEN AND CODEINE PHOSPHATE 300; 30 MG/1; MG/1
TABLET ORAL
COMMUNITY

## 2020-02-20 NOTE — PATIENT INSTRUCTIONS
1.  Continue omeprazole. 2.  Please contact me with any changes. 3.  Follow-up office visit in 1 year. 4.  You are due for a colonoscopy in December 2023.

## 2020-06-09 ENCOUNTER — TELEPHONE (OUTPATIENT)
Dept: GASTROENTEROLOGY | Facility: CLINIC | Age: 60
End: 2020-06-09

## 2020-06-09 NOTE — TELEPHONE ENCOUNTER
Received fax from [facility] Prescriber Response Form, filled out, faxed to [phone], fax confirmation received.      \"Patient information for your Consideration\"  For omeprazole sent to scanning

## 2020-06-12 ENCOUNTER — LAB REQUISITION (OUTPATIENT)
Dept: LAB | Facility: HOSPITAL | Age: 60
End: 2020-06-12
Payer: COMMERCIAL

## 2020-06-12 DIAGNOSIS — Z20.828 CONTACT WITH AND (SUSPECTED) EXPOSURE TO OTHER VIRAL COMMUNICABLE DISEASES: ICD-10-CM

## 2020-08-29 ENCOUNTER — HOSPITAL ENCOUNTER (EMERGENCY)
Facility: HOSPITAL | Age: 60
Discharge: HOME | End: 2020-08-29
Attending: EMERGENCY MEDICINE
Payer: COMMERCIAL

## 2020-08-29 ENCOUNTER — APPOINTMENT (OUTPATIENT)
Dept: CT IMAGING | Facility: HOSPITAL | Age: 60
End: 2020-08-29
Attending: EMERGENCY MEDICINE
Payer: COMMERCIAL

## 2020-08-29 VITALS
BODY MASS INDEX: 28.49 KG/M2 | WEIGHT: 188 LBS | HEART RATE: 57 BPM | RESPIRATION RATE: 17 BRPM | TEMPERATURE: 98 F | OXYGEN SATURATION: 99 % | DIASTOLIC BLOOD PRESSURE: 81 MMHG | HEIGHT: 68 IN | SYSTOLIC BLOOD PRESSURE: 116 MMHG

## 2020-08-29 DIAGNOSIS — R10.9 ACUTE LEFT FLANK PAIN: Primary | ICD-10-CM

## 2020-08-29 LAB
ANION GAP SERPL CALC-SCNC: 6 MMOL/L (ref 0–18)
BASOPHILS # BLD AUTO: 0.02 X10(3) UL (ref 0–0.2)
BASOPHILS NFR BLD AUTO: 0.3 %
BILIRUB UR QL: NEGATIVE
BUN BLD-MCNC: 13 MG/DL (ref 7–18)
BUN/CREAT SERPL: 11.5 (ref 10–20)
CALCIUM BLD-MCNC: 9.3 MG/DL (ref 8.5–10.1)
CHLORIDE SERPL-SCNC: 108 MMOL/L (ref 98–112)
CLARITY UR: CLEAR
CO2 SERPL-SCNC: 26 MMOL/L (ref 21–32)
COLOR UR: YELLOW
CREAT BLD-MCNC: 1.13 MG/DL (ref 0.7–1.3)
DEPRECATED RDW RBC AUTO: 39.9 FL (ref 35.1–46.3)
EOSINOPHIL # BLD AUTO: 0.08 X10(3) UL (ref 0–0.7)
EOSINOPHIL NFR BLD AUTO: 1.2 %
ERYTHROCYTE [DISTWIDTH] IN BLOOD BY AUTOMATED COUNT: 12.2 % (ref 11–15)
GLUCOSE BLD-MCNC: 114 MG/DL (ref 70–99)
GLUCOSE UR-MCNC: NEGATIVE MG/DL
HCT VFR BLD AUTO: 43.9 % (ref 39–53)
HGB BLD-MCNC: 15.2 G/DL (ref 13–17.5)
HGB UR QL STRIP.AUTO: NEGATIVE
IMM GRANULOCYTES # BLD AUTO: 0.02 X10(3) UL (ref 0–1)
IMM GRANULOCYTES NFR BLD: 0.3 %
KETONES UR-MCNC: NEGATIVE MG/DL
LEUKOCYTE ESTERASE UR QL STRIP.AUTO: NEGATIVE
LYMPHOCYTES # BLD AUTO: 1.96 X10(3) UL (ref 1–4)
LYMPHOCYTES NFR BLD AUTO: 29.4 %
MCH RBC QN AUTO: 30.6 PG (ref 26–34)
MCHC RBC AUTO-ENTMCNC: 34.6 G/DL (ref 31–37)
MCV RBC AUTO: 88.3 FL (ref 80–100)
MONOCYTES # BLD AUTO: 0.35 X10(3) UL (ref 0.1–1)
MONOCYTES NFR BLD AUTO: 5.2 %
NEUTROPHILS # BLD AUTO: 4.24 X10 (3) UL (ref 1.5–7.7)
NEUTROPHILS # BLD AUTO: 4.24 X10(3) UL (ref 1.5–7.7)
NEUTROPHILS NFR BLD AUTO: 63.6 %
NITRITE UR QL STRIP.AUTO: NEGATIVE
OSMOLALITY SERPL CALC.SUM OF ELEC: 291 MOSM/KG (ref 275–295)
PH UR: 7 [PH] (ref 5–8)
PLATELET # BLD AUTO: 291 10(3)UL (ref 150–450)
POTASSIUM SERPL-SCNC: 3.9 MMOL/L (ref 3.5–5.1)
PROT UR-MCNC: NEGATIVE MG/DL
RBC # BLD AUTO: 4.97 X10(6)UL (ref 4.3–5.7)
SODIUM SERPL-SCNC: 140 MMOL/L (ref 136–145)
SP GR UR STRIP: 1.02 (ref 1–1.03)
UROBILINOGEN UR STRIP-ACNC: <2
WBC # BLD AUTO: 6.7 X10(3) UL (ref 4–11)

## 2020-08-29 PROCEDURE — 96374 THER/PROPH/DIAG INJ IV PUSH: CPT

## 2020-08-29 PROCEDURE — 99284 EMERGENCY DEPT VISIT MOD MDM: CPT

## 2020-08-29 PROCEDURE — 80048 BASIC METABOLIC PNL TOTAL CA: CPT

## 2020-08-29 PROCEDURE — 80048 BASIC METABOLIC PNL TOTAL CA: CPT | Performed by: EMERGENCY MEDICINE

## 2020-08-29 PROCEDURE — 74176 CT ABD & PELVIS W/O CONTRAST: CPT | Performed by: EMERGENCY MEDICINE

## 2020-08-29 PROCEDURE — 81003 URINALYSIS AUTO W/O SCOPE: CPT | Performed by: EMERGENCY MEDICINE

## 2020-08-29 PROCEDURE — 85025 COMPLETE CBC W/AUTO DIFF WBC: CPT | Performed by: EMERGENCY MEDICINE

## 2020-08-29 PROCEDURE — 85025 COMPLETE CBC W/AUTO DIFF WBC: CPT

## 2020-08-29 PROCEDURE — 96361 HYDRATE IV INFUSION ADD-ON: CPT

## 2020-08-29 PROCEDURE — 96375 TX/PRO/DX INJ NEW DRUG ADDON: CPT

## 2020-08-29 RX ORDER — NAPROXEN 500 MG/1
500 TABLET ORAL 2 TIMES DAILY PRN
Qty: 14 TABLET | Refills: 0 | Status: SHIPPED | OUTPATIENT
Start: 2020-08-29 | End: 2020-09-05

## 2020-08-29 RX ORDER — HYDROCODONE BITARTRATE AND ACETAMINOPHEN 5; 325 MG/1; MG/1
1 TABLET ORAL EVERY 6 HOURS PRN
Qty: 10 TABLET | Refills: 0 | Status: SHIPPED | OUTPATIENT
Start: 2020-08-29 | End: 2020-09-05

## 2020-08-29 RX ORDER — KETOROLAC TROMETHAMINE 15 MG/ML
15 INJECTION, SOLUTION INTRAMUSCULAR; INTRAVENOUS ONCE
Status: COMPLETED | OUTPATIENT
Start: 2020-08-29 | End: 2020-08-29

## 2020-08-29 RX ORDER — CYCLOBENZAPRINE HCL 10 MG
10 TABLET ORAL 3 TIMES DAILY PRN
Qty: 20 TABLET | Refills: 0 | Status: SHIPPED | OUTPATIENT
Start: 2020-08-29 | End: 2020-09-05

## 2020-08-29 RX ORDER — ONDANSETRON 2 MG/ML
INJECTION INTRAMUSCULAR; INTRAVENOUS
Status: DISCONTINUED
Start: 2020-08-29 | End: 2020-08-29

## 2020-08-29 RX ORDER — ONDANSETRON 2 MG/ML
4 INJECTION INTRAMUSCULAR; INTRAVENOUS ONCE
Status: DISCONTINUED | OUTPATIENT
Start: 2020-08-29 | End: 2020-08-29

## 2020-08-29 RX ORDER — ONDANSETRON 2 MG/ML
4 INJECTION INTRAMUSCULAR; INTRAVENOUS ONCE
Status: COMPLETED | OUTPATIENT
Start: 2020-08-29 | End: 2020-08-29

## 2020-08-29 RX ORDER — MORPHINE SULFATE 4 MG/ML
2 INJECTION, SOLUTION INTRAMUSCULAR; INTRAVENOUS ONCE
Status: COMPLETED | OUTPATIENT
Start: 2020-08-29 | End: 2020-08-29

## 2020-08-29 NOTE — ED INITIAL ASSESSMENT (HPI)
Atraumatic left flank pain worsening thru yesterday. Vomiting and nausea today. Denies hematuria or dysuria.    1000mg Tylenol at 0930

## 2020-08-29 NOTE — ED NOTES
Discharge instructions and prescriptions given to pt and wife. Pt and wife verbalized understanding of home care, medication use, and to follow up with pcp. Pt and wife denied further questions or concerns.  Pt ambulatory out of ED with wife, discharged in

## 2020-08-29 NOTE — ED NOTES
Pt presents from triage with wife with c/o left flank pain that pt describes as achy and constant that intermittently wraps around to left lower abd progressively worsening since yesterday morning. Pt reports poor po intake since then.  Pt c/o nausea and vo

## 2020-08-29 NOTE — ED PROVIDER NOTES
Patient Seen in: [facility] AND [facility] Emergency Department      History   Patient presents with:  Abdomen/Flank Pain    Stated Complaint: back pain since yesterday    HPI    Patient presents the emergency department today complaining of left-sided flank jerardo Exam  Vitals signs and nursing note reviewed. Constitutional:       General: He is not in acute distress. Appearance: He is well-developed. Comments: Uncomfortable but in no distress. HENT:      Head: Normocephalic.    Eyes:      Extraocular Mo RAINBOW DRAW GOLD   CBC W/ DIFFERENTIAL                  MDM       Differential diagnosis was considered for renal colic, nephrolithiasis, pyelonephritis, lumbar radiculopathy, abdominal aortic aneurysm.       Pulse Ox: 100%, Normal,     Cardiac Monitor:

## 2021-02-17 RX ORDER — OMEPRAZOLE 40 MG/1
CAPSULE, DELAYED RELEASE ORAL
Qty: 30 CAPSULE | Refills: 2 | Status: SHIPPED | OUTPATIENT
Start: 2021-02-17 | End: 2021-04-23

## 2021-02-17 NOTE — TELEPHONE ENCOUNTER
Requested Prescriptions     Pending Prescriptions Disp Refills   • Omeprazole 40 MG Oral Capsule Delayed Release 30 capsule 11     Sig: TAKE 1 CAPSULE(40 MG) BY MOUTH DAILY     LOV 2/19/2020   LR 2/19/2020     em

## 2021-02-18 NOTE — TELEPHONE ENCOUNTER
I called and left a voicemail message for patient to call back to schedule a follow-up appointment. [facility] message sent to schedule follow-up appt. PHONE ROOM STAFF,  Please assist in scheduling a follow-up appointment.     Thank you

## 2021-02-18 NOTE — TELEPHONE ENCOUNTER
Please contact the patient. I have refilled his prescription for 3 months. He should be seen in the office in follow-up at his convenience.

## 2021-02-25 ENCOUNTER — HOSPITAL ENCOUNTER (OUTPATIENT)
Dept: CT IMAGING | Age: 61
Discharge: HOME | End: 2021-02-25
Attending: INTERNAL MEDICINE
Payer: COMMERCIAL

## 2021-02-25 DIAGNOSIS — R91.1 PULMONARY NODULE: ICD-10-CM

## 2021-02-25 PROCEDURE — 71250 CT THORAX DX C-: CPT | Performed by: INTERNAL MEDICINE

## 2021-03-17 ENCOUNTER — TELEPHONE (OUTPATIENT)
Dept: GASTROENTEROLOGY | Facility: CLINIC | Age: 61
End: 2021-03-17

## 2021-03-17 NOTE — TELEPHONE ENCOUNTER
Prior Authorization request for;    •  Omeprazole 40 MG Oral Capsule Delayed Release, TAKE 1 CAPSULE(40 MG) BY MOUTH DAILY, Disp: 30 capsule, Rfl: 2    MESSAGE:   Plan does not cover this medication.  Please call plan at [phone] to initiate prior autho

## 2021-03-18 ENCOUNTER — PATIENT MESSAGE (OUTPATIENT)
Dept: ENDOCRINOLOGY CLINIC | Facility: CLINIC | Age: 61
End: 2021-03-18

## 2021-03-18 NOTE — TELEPHONE ENCOUNTER
Medication PA Requested: Omeprazole 40 MG dr capsule                     CoverMyMeds Used: Yes  Key:[id number]   Si MG by mouth daily  DX Code:  K21.9                                     PA submitted with last office visit note.  Awaiting response from michelle

## 2021-03-18 NOTE — TELEPHONE ENCOUNTER
[facility]. Prescription processed over the phone and goes insurance for $4.03. [employer] message sent to patient.

## 2021-04-22 ENCOUNTER — OFFICE VISIT (OUTPATIENT)
Dept: GASTROENTEROLOGY | Facility: CLINIC | Age: 61
End: 2021-04-22
Payer: COMMERCIAL

## 2021-04-22 VITALS
BODY MASS INDEX: 28.95 KG/M2 | HEIGHT: 68 IN | SYSTOLIC BLOOD PRESSURE: 123 MMHG | HEART RATE: 70 BPM | WEIGHT: 191 LBS | DIASTOLIC BLOOD PRESSURE: 74 MMHG

## 2021-04-22 DIAGNOSIS — Z86.010 PERSONAL HISTORY OF COLONIC POLYPS: ICD-10-CM

## 2021-04-22 DIAGNOSIS — K21.9 GASTROESOPHAGEAL REFLUX DISEASE WITHOUT ESOPHAGITIS: Primary | ICD-10-CM

## 2021-04-22 PROCEDURE — 3078F DIAST BP <80 MM HG: CPT | Performed by: INTERNAL MEDICINE

## 2021-04-22 PROCEDURE — 3074F SYST BP LT 130 MM HG: CPT | Performed by: INTERNAL MEDICINE

## 2021-04-22 PROCEDURE — 99213 OFFICE O/P EST LOW 20 MIN: CPT | Performed by: INTERNAL MEDICINE

## 2021-04-22 PROCEDURE — 3008F BODY MASS INDEX DOCD: CPT | Performed by: INTERNAL MEDICINE

## 2021-04-22 RX ORDER — FAMOTIDINE 20 MG/1
20 TABLET ORAL DAILY
COMMUNITY
Start: 2020-05-18

## 2021-04-22 RX ORDER — FAMOTIDINE 20 MG/1
20 TABLET ORAL EVERY EVENING
COMMUNITY
Start: 2021-03-30

## 2021-04-22 RX ORDER — LISINOPRIL 10 MG/1
10 TABLET ORAL DAILY
COMMUNITY
Start: 2020-03-23

## 2021-04-22 RX ORDER — ESOMEPRAZOLE MAGNESIUM 40 MG/1
CAPSULE, DELAYED RELEASE ORAL
COMMUNITY

## 2021-04-22 RX ORDER — HYDROCODONE BITARTRATE AND ACETAMINOPHEN 10; 325 MG/1; MG/1
TABLET ORAL
COMMUNITY

## 2021-04-22 NOTE — PROGRESS NOTES
HPI/Subjective:   Patient ID: Ángela Redmond is a 56year old male. HPI  Evette Tucker returns in follow-up.   He was last seen in February 2020.     As per previous notes Evette Tucker has a history of endoscopy negative gastroesophageal reflux (2003) controlled with PPI kg)  08/29/20 : 188 lb (85.3 kg)  02/19/20 : 195 lb (88.5 kg)  07/18/19 : 195 lb (88.5 kg)  12/31/18 : 192 lb (87.1 kg)  02/14/18 : 190 lb (86.2 kg)  02/12/18 : 199 lb (90.3 kg)      Current Outpatient Medications   Medication Sig Dispense Refill   • Omepr murmur heard. Pulmonary:      Effort: Pulmonary effort is normal. No respiratory distress. Breath sounds: Normal breath sounds. No wheezing or rales. Abdominal:      General: Bowel sounds are normal. There is no distension.       Palpations: Lui Quinn

## 2021-04-23 RX ORDER — OMEPRAZOLE 40 MG/1
CAPSULE, DELAYED RELEASE ORAL
Qty: 90 CAPSULE | Refills: 3 | Status: SHIPPED | OUTPATIENT
Start: 2021-04-23

## 2021-04-23 NOTE — PATIENT INSTRUCTIONS
1.  Continue omeprazole. 2.  Jose Francisco, I would prefer that Dr. Eliz Hickman prescribe the alprazolam.  3.  Follow-up office visit in 1 year. 4.  Please contact me sooner with any changes.

## 2021-09-05 ENCOUNTER — APPOINTMENT (OUTPATIENT)
Dept: GENERAL RADIOLOGY | Facility: HOSPITAL | Age: 61
End: 2021-09-05
Payer: COMMERCIAL

## 2021-09-05 ENCOUNTER — HOSPITAL ENCOUNTER (EMERGENCY)
Facility: HOSPITAL | Age: 61
Discharge: HOME | End: 2021-09-05
Payer: COMMERCIAL

## 2021-09-05 VITALS
SYSTOLIC BLOOD PRESSURE: 110 MMHG | OXYGEN SATURATION: 98 % | BODY MASS INDEX: 29 KG/M2 | WEIGHT: 189 LBS | RESPIRATION RATE: 18 BRPM | HEART RATE: 66 BPM | DIASTOLIC BLOOD PRESSURE: 70 MMHG | TEMPERATURE: 98 F

## 2021-09-05 DIAGNOSIS — S61.209A AVULSION, FINGER TIP, INITIAL ENCOUNTER: Primary | ICD-10-CM

## 2021-09-05 PROCEDURE — 99283 EMERGENCY DEPT VISIT LOW MDM: CPT

## 2021-09-05 PROCEDURE — 73140 X-RAY EXAM OF FINGER(S): CPT

## 2021-09-05 RX ORDER — CEPHALEXIN 500 MG/1
500 CAPSULE ORAL 4 TIMES DAILY
Qty: 20 CAPSULE | Refills: 0 | Status: SHIPPED | OUTPATIENT
Start: 2021-09-05 | End: 2021-09-10

## 2021-09-05 RX ORDER — IBUPROFEN 600 MG/1
600 TABLET ORAL ONCE
Status: COMPLETED | OUTPATIENT
Start: 2021-09-05 | End: 2021-09-05

## 2021-09-05 NOTE — ED PROVIDER NOTES
Patient Seen in: [facility] AND [facility] Emergency Department    History   Patient presents with:  Laceration/Abrasion      HPI    68-year-old male presents the ER with complaint of injury to the tip of the third finger of the left hand.   Patient states he was u Smoker      Smokeless tobacco: Never Used    Substance and Sexual Activity      Alcohol use: Yes        Comment: Beer & wine, occasional      Drug use: No    Other Topics      Concerns:        Caffeine Concern: Yes          Coffee, 1 cup daily      ROS  Al hand: Laceration present. Arms:       Cervical back: Normal range of motion and neck supple. Comments: Tip of third digit with skin avulsion with involvement of the distal nail. Skin:     General: Skin is warm and dry.    Neurological:      Me injury to the tip of the third digit of his left hand. Patient's x-ray shows no bone involvement. Patient wound irrigated and dressed appropriately. Patient instructed to follow-up with hand if pain is worse.   Patient given prophylactic Keflex by donnie

## 2022-04-28 ENCOUNTER — OFFICE VISIT (OUTPATIENT)
Dept: GASTROENTEROLOGY | Facility: CLINIC | Age: 62
End: 2022-04-28
Payer: COMMERCIAL

## 2022-04-28 VITALS
WEIGHT: 196 LBS | BODY MASS INDEX: 29.7 KG/M2 | HEART RATE: 89 BPM | DIASTOLIC BLOOD PRESSURE: 82 MMHG | SYSTOLIC BLOOD PRESSURE: 117 MMHG | HEIGHT: 68 IN

## 2022-04-28 DIAGNOSIS — K21.9 GASTROESOPHAGEAL REFLUX DISEASE WITHOUT ESOPHAGITIS: Primary | ICD-10-CM

## 2022-04-28 DIAGNOSIS — Z86.010 PERSONAL HISTORY OF COLONIC POLYPS: ICD-10-CM

## 2022-04-28 PROCEDURE — 99213 OFFICE O/P EST LOW 20 MIN: CPT | Performed by: INTERNAL MEDICINE

## 2022-04-28 PROCEDURE — 3008F BODY MASS INDEX DOCD: CPT | Performed by: INTERNAL MEDICINE

## 2022-04-28 PROCEDURE — 3079F DIAST BP 80-89 MM HG: CPT | Performed by: INTERNAL MEDICINE

## 2022-04-28 PROCEDURE — 3074F SYST BP LT 130 MM HG: CPT | Performed by: INTERNAL MEDICINE

## 2022-04-28 RX ORDER — OMEPRAZOLE 40 MG/1
40 CAPSULE, DELAYED RELEASE ORAL
Qty: 180 CAPSULE | Refills: 3 | Status: SHIPPED | OUTPATIENT
Start: 2022-04-28

## 2022-04-28 RX ORDER — MAG HYDROX/ALUMINUM HYD/SIMETH 400-400-40
5000 SUSPENSION, ORAL (FINAL DOSE FORM) ORAL DAILY
COMMUNITY

## 2022-04-28 RX ORDER — MELATONIN
1000 DAILY
COMMUNITY

## 2022-04-28 NOTE — PATIENT INSTRUCTIONS
1. Advised weight loss. 2.  Increase omeprazole to 40 mg twice daily before breakfast and supper. 3.  May stop the famotidine at dinnertime but continue famotidine at bedtime. 4.  Please contact me with a symptom update in 6 weeks. 5.  You are due for a colonoscopy in December 2023.  5.  Follow-up office visit in 1 year or sooner if issues arise.

## 2023-02-23 ENCOUNTER — TELEPHONE (OUTPATIENT)
Facility: CLINIC | Age: 63
End: 2023-02-23

## 2023-02-23 DIAGNOSIS — Z80.0 FAMILY HISTORY OF MALIGNANT NEOPLASM OF GASTROINTESTINAL TRACT: ICD-10-CM

## 2023-02-23 DIAGNOSIS — Z86.010 PERSONAL HISTORY OF COLONIC POLYPS: Primary | ICD-10-CM

## 2023-02-24 NOTE — TELEPHONE ENCOUNTER
May schedule for a family history of colon cancer and personal history of colon polyps following a split dose MiraLAX/Gatorade preparation and either IV sedation or monitored anesthesia care per scheduling.

## 2023-02-24 NOTE — TELEPHONE ENCOUNTER
Scheduled for:  Colonoscopy [address]  Provider Name:  Dr. Matthew Johnson  Date:  12/12/2023  Location:  [facility]  Sedation:  MAC  Time:  7:30am, (pt is aware to arrive at 6:30am)  Prep:  Miralax  Meds/Allergies Reconciled?:  Physician reviewed    Diagnosis with codes:  Family hx of colon cancer Z80.0, Hx of colon Polyps Z86.010  Was patient informed to call insurance with codes (Y/N):  Yes, I confirmed BC[employer] PPO insurance with this patient. Referral sent?:  Referral was sent at the time of electronic surgical scheduling. [facility] or [address] notified?:  I sent an electronic request to Endo Scheduling and received a confirmation today. Medication Orders:  N/A  Misc Orders:  Patient was informed that they will need a COVID 19 test prior to their procedure. Patient verbally understood & will await a phone call from [facility] to schedule.      Further instructions given by staff:  I discussed the prep instructions with the patient which he verbally understood and is aware that I will send the instructions via [employer]

## 2023-04-10 RX ORDER — OMEPRAZOLE 40 MG/1
40 CAPSULE, DELAYED RELEASE ORAL
Qty: 180 CAPSULE | Refills: 3 | Status: SHIPPED | OUTPATIENT
Start: 2023-04-10

## 2023-12-08 ENCOUNTER — TELEPHONE (OUTPATIENT)
Facility: CLINIC | Age: 63
End: 2023-12-08

## 2023-12-08 NOTE — TELEPHONE ENCOUNTER
Patient has questions regarding prep instructions for 12/12/2023 CLN - also requesting instructions to be sent to [facility] Please call. Thank you.

## 2023-12-08 NOTE — TELEPHONE ENCOUNTER
Patient contacted and prep instructions reviewed with patient. Patient requesting prep instructions to be sent through [address] Patient voiced understanding. No further questions at this time. Prep instruction can be found under 02/24/2023 [employer] message. Patient advised.

## 2023-12-12 ENCOUNTER — HOSPITAL ENCOUNTER (OUTPATIENT)
Facility: HOSPITAL | Age: 63
Setting detail: HOSPITAL OUTPATIENT SURGERY
Discharge: HOME | End: 2023-12-12
Attending: INTERNAL MEDICINE | Admitting: INTERNAL MEDICINE
Payer: MEDICARE

## 2023-12-12 ENCOUNTER — ANESTHESIA (OUTPATIENT)
Dept: ENDOSCOPY | Facility: HOSPITAL | Age: 63
End: 2023-12-12
Payer: MEDICARE

## 2023-12-12 ENCOUNTER — ANESTHESIA EVENT (OUTPATIENT)
Dept: ENDOSCOPY | Facility: HOSPITAL | Age: 63
End: 2023-12-12
Payer: MEDICARE

## 2023-12-12 VITALS
OXYGEN SATURATION: 93 % | SYSTOLIC BLOOD PRESSURE: 106 MMHG | BODY MASS INDEX: 30.31 KG/M2 | HEART RATE: 63 BPM | HEIGHT: 68 IN | DIASTOLIC BLOOD PRESSURE: 67 MMHG | RESPIRATION RATE: 12 BRPM | WEIGHT: 200 LBS

## 2023-12-12 DIAGNOSIS — Z86.010 PERSONAL HISTORY OF COLONIC POLYPS: ICD-10-CM

## 2023-12-12 DIAGNOSIS — Z80.0 FAMILY HISTORY OF MALIGNANT NEOPLASM OF GASTROINTESTINAL TRACT: ICD-10-CM

## 2023-12-12 PROCEDURE — 45385 COLONOSCOPY W/LESION REMOVAL: CPT | Performed by: INTERNAL MEDICINE

## 2023-12-12 PROCEDURE — 0DBL8ZX EXCISION OF TRANSVERSE COLON, VIA NATURAL OR ARTIFICIAL OPENING ENDOSCOPIC, DIAGNOSTIC: ICD-10-PCS | Performed by: INTERNAL MEDICINE

## 2023-12-12 PROCEDURE — 0DBH8ZX EXCISION OF CECUM, VIA NATURAL OR ARTIFICIAL OPENING ENDOSCOPIC, DIAGNOSTIC: ICD-10-PCS | Performed by: INTERNAL MEDICINE

## 2023-12-12 PROCEDURE — 0DBN8ZX EXCISION OF SIGMOID COLON, VIA NATURAL OR ARTIFICIAL OPENING ENDOSCOPIC, DIAGNOSTIC: ICD-10-PCS | Performed by: INTERNAL MEDICINE

## 2023-12-12 PROCEDURE — 0DBP8ZX EXCISION OF RECTUM, VIA NATURAL OR ARTIFICIAL OPENING ENDOSCOPIC, DIAGNOSTIC: ICD-10-PCS | Performed by: INTERNAL MEDICINE

## 2023-12-12 RX ORDER — SODIUM CHLORIDE, SODIUM LACTATE, POTASSIUM CHLORIDE, CALCIUM CHLORIDE 600; 310; 30; 20 MG/100ML; MG/100ML; MG/100ML; MG/100ML
INJECTION, SOLUTION INTRAVENOUS CONTINUOUS
Status: DISCONTINUED | OUTPATIENT
Start: 2023-12-12 | End: 2023-12-12

## 2023-12-12 RX ORDER — LIDOCAINE HYDROCHLORIDE 20 MG/ML
INJECTION, SOLUTION EPIDURAL; INFILTRATION; INTRACAUDAL; PERINEURAL AS NEEDED
Status: DISCONTINUED | OUTPATIENT
Start: 2023-12-12 | End: 2023-12-12 | Stop reason: SURG

## 2023-12-12 RX ADMIN — LIDOCAINE HYDROCHLORIDE 40 MG: 20 INJECTION, SOLUTION EPIDURAL; INFILTRATION; INTRACAUDAL; PERINEURAL at 07:35:00

## 2023-12-12 RX ADMIN — SODIUM CHLORIDE, SODIUM LACTATE, POTASSIUM CHLORIDE, CALCIUM CHLORIDE: 600; 310; 30; 20 INJECTION, SOLUTION INTRAVENOUS at 07:33:00

## 2023-12-12 RX ADMIN — SODIUM CHLORIDE, SODIUM LACTATE, POTASSIUM CHLORIDE, CALCIUM CHLORIDE: 600; 310; 30; 20 INJECTION, SOLUTION INTRAVENOUS at 08:12:00

## 2023-12-12 NOTE — OPERATIVE REPORT
[address] Endoscopy Report      Date of Procedure:  12/12/23      Preoperative Diagnosis:  1. Colorectal cancer screening  2. Family history of colon cancer  3. Personal history of adenomatous colon polyps       Postoperative Diagnosis:  1. Colon polyps  2. Internal hemorrhoids      Procedure:    Colonoscopy with polypectomy      Surgeon:  Geneva Kelley M.D. Anesthesia:  Monitored anesthesia care  Cecal withdrawal time: 30 minutes  EBL:  Insignificant      Brief History: This is a 70year old male who presents for a screening/surveillance colonoscopy in the setting of a family history of colon cancer in his brother at the age of 76 and a personal history of adenomatous colon polyps. The patient's last colonoscopy was 5 years prior and negative for metachronous adenomas. Other than occasional rectal bleeding presumed to be hemorrhoidal, the patient has been asymptomatic from a lower gastrointestinal tract standpoint. Technique:  After informed consent, the patient was placed in the left lateral recumbent position. Digital rectal examination revealed no palpable intraluminal abnormalities. An Olympus variable stiffness 190 series HD colonoscope was inserted into the rectum and advanced under direct vision by following the lumen to the terminal ileum. The colon was examined upon withdrawal in the left lateral recumbent position. Findings:  The preparation of the colon was rendered good. Vegetable matter was present which required irrigation and suctioning. The terminal ileum was examined for several cm and visually normal.  The ileocecal valve was well preserved. The visualized colonic mucosa from the cecum to the anal verge was normal with an intact vascular pattern. There were #5 polyps seen within the colon which removed as follows:    1. In the cecum there was a 6 mm sessile polyp which was cold snare excised and retrieved.   2.  In the transverse colon there were #2 8-10 mm sessile serrated polyps which were cold snare excised and retrieved. 3.  In the proximal sigmoid colon there was a 3 mm sessile polyp which was cold snare excised and retrieved. 4.  In the rectum there was a 3-4 mm sessile polyp which was cold snare excised and retrieved. Inspection of all sites revealed no evidence of ongoing bleeding. There were no other colonic polyps, mass lesions, vascular anomalies or signs of inflammation seen. Retroflexion in the rectum revealed prominent internal hemorrhoids with overlying red suhail markings. The procedure was well tolerated without immediate complication. Impression:  1. Colon polyps  2. Internal hemorrhoids with stigmata of bleeding    Recommendations:  1. Follow-up biopsy results. 2.  Polyp histology to determine recommendations regarding follow-up.         Butch Greenberg MD  12/12/2023

## 2023-12-12 NOTE — ANESTHESIA POSTPROCEDURE EVALUATION
Patient: Harvinder CORREIA [facility]    Procedure Summary       Date: 12/12/23 Room / Location: [address] [place] [address] ENDOSCOPY 04 / [address] [place] [address] ENDOSCOPY    Anesthesia Start: 9466 Anesthesia Stop:     Procedure: COLONOSCOPY Diagnosis:       Personal history of colonic polyps      Family history of malignant neoplasm of gastrointestinal tract      (Colon polyps, internal hemorrhoids)    Surgeons: Nurys Payton MD Anesthesiologist: Cleopatra Murrieta CRNA    Anesthesia Type: MAC, general ASA Status: 2            Anesthesia Type: MAC, general    Vitals Value Taken Time   /70 12/12/23 0820   Temp  12/12/23 0820   Pulse 72 12/12/23 0820   Resp 18 12/12/23 0820   SpO2 96 % 12/12/23 0820       [facility] AN Post Evaluation:   Patient Evaluated in Patient location: Endo recovery.   Patient Participation: complete - patient participated  Level of Consciousness: awake and alert  Pain Score: 0  Pain Management: adequate  Airway Patency:patent  Yes    Cardiovascular Status: acceptable  Respiratory Status: acceptable  Postoperative Hydration acceptable      Oriana Georges CRNA  12/12/2023 8:20 AM

## 2023-12-12 NOTE — DISCHARGE INSTRUCTIONS

## 2023-12-12 NOTE — H&P
History & Physical Examination    Patient Name: Marina Chu  MRN: [id number]  [facility]: [id number]  YOB: 1960    Diagnosis: Colorectal cancer screening, family history of colon cancer, personal history of adenomatous colon polyps      Medications Prior to Admission   Medication Sig Dispense Refill Last Dose    famoTIDine 20 MG Oral Tab Take 1 tablet (20 mg total) by mouth every evening. Past Week    Omeprazole 40 MG Oral Capsule Delayed Release Take 1 capsule (40 mg total) by mouth 2 (two) times daily before meals. 180 capsule 3 12/11/2023 at 0900    cyanocobalamin 1000 MCG Oral Tab Take 1 tablet (1,000 mcg total) by mouth daily. 12/11/2023 at 0900    cholecalciferol (VITAMIN D3) 125 MCG (5000 UT) Oral Cap Take 1 capsule (5,000 Units total) by mouth daily. 12/5/2023 at 0900    Probiotic Product (PROBIOTIC-10 OR) Take 1 capsule by mouth daily. 12/5/2023 at 0900    HYDROcodone-acetaminophen  MG Oral Tab hydrocodone 10 mg-acetaminophen 325 mg tablet (Patient not taking: Reported on 4/22/2021)       lisinopril 10 MG Oral Tab Take 1 tablet (10 mg total) by mouth daily. 12/11/2023 at 0900    Acetaminophen-Codeine #3 300-30 MG Oral Tab acetaminophen 300 mg-codeine 30 mg tablet (Patient not taking: Reported on 4/28/2022)       hydrochlorothiazide 12.5 MG Oral Cap TK 1 C PO QAM   12/11/2023 at 0900    aspirin 81 MG Oral Tab Take 81 mg by mouth daily. (Patient not taking: Reported on 2/23/2023)       Ascorbic Acid (VITAMIN C) 1000 MG Oral Tab Take 1 tablet (1,000 mg total) by mouth daily. 12/5/2023 at 0900    Calcium-Magnesium-Zinc 167-83-8 MG Oral Tab Take 1 tablet by mouth daily. (Patient not taking: Reported on 4/22/2021)       TURMERIC CURCUMIN OR Take 1 tablet by mouth daily. 12/5/2023 at 0900    Multiple Vitamin (MULTIVITAMINS OR) Take  by mouth daily. Omega-3 Fatty Acids (RA FISH OIL) 1000 MG Oral Cap Take 1,000 capsules by mouth daily.         Current Facility-Administered Medications   Medication Dose Route Frequency    lactated ringers infusion   Intravenous Continuous       Allergies: No Known Allergies    Past Medical History:   Diagnosis Date    Cancer ([address] 2019    skin cancer    Esophageal reflux     Hearing impairment     Ringing in ears    High blood pressure     High cholesterol      Past Surgical History:   Procedure Laterality Date    ANESTH,SURGERY OF SHOULDER      COLONOSCOPY  2008, 2013    COLONOSCOPY      COLONOSCOPY N/A 12/31/2018    Procedure: COLONOSCOPY;  Surgeon: Lois Raman MD;  Location: [facility] ENDOSCOPY    EGD      KNEE SURGERY Right 2013    ACL     Family History   Problem Relation Age of Onset    Gastro-Intestinal Disorder Father         Stomach ulcer    Other (Other) Father 67        Multiple sclerosis    Heart Disease Mother     Cancer Mother         uterine cancer    Cancer Sister         breast cancer    Cancer Brother         Colon cancer    Cancer Maternal Grandmother         uterine cancer    Other (Other) Maternal Grandmother     Other (Other) Maternal Grandfather     Other (Other) Paternal Grandmother     Other (Other) Paternal Grandfather     Cancer Brother         Cancer-pancreatic    Cancer Sister         Lung cancer     Social History     Tobacco Use    Smoking status: Never    Smokeless tobacco: Never   Substance Use Topics    Alcohol use: Yes     Comment: Beer & wine, occasional       SYSTEM Check if Review is Normal Check if Physical Exam is Normal If not normal, please explain:   LILY Hypatia.Brooks ] [ Francisco Javier Valero  Hypatia.Brooks ] [ Renny Campbell Hypatia.Brooks ] [ Michelle Washington Hypatia.Brooks ] [ Miguel Carmen Hypatia.Brooks ] [ Jamel Runner Hypatia.Brooks ] [ Judyann Denver        [ x ] I have discussed the risks and benefits and alternatives with the patient/family. They understand and agree to proceed with plan of care. [ x ] I have reviewed the History and Physical done within the last 30 days. Any changes noted above.     Karolina Estrada MD  12/12/2023  7:29 AM

## 2023-12-13 ENCOUNTER — TELEPHONE (OUTPATIENT)
Facility: CLINIC | Age: 63
End: 2023-12-13

## 2023-12-13 NOTE — TELEPHONE ENCOUNTER
Recall colonoscopy in 3 years per Dr Narciso Munguia.     Colon done 12/12/2023    Health maintenance updated and message sent to patient outreach to repeat colonoscopy in 3 years    Results discussed with patient over the phone by Dr. Narciso Munguia

## 2023-12-13 NOTE — TELEPHONE ENCOUNTER
----- Message from Reuben Sanchez MD sent at 12/12/2023  4:53 PM CST -----  I spoke to [place]. He is feeling well. He had #5 adenomatous polyps removed (tubular adenoma and a sessile serrated adenomata). I discussed the significance. I have recommended a surveillance colonoscopy in 3 years. GI RNs: Please enter colonoscopy recall for 3 years.

## 2023-12-15 ENCOUNTER — LAB REQUISITION (OUTPATIENT)
Dept: LAB | Facility: HOSPITAL | Age: 63
End: 2023-12-15
Payer: MEDICARE

## 2023-12-15 DIAGNOSIS — D48.5 NEOPLASM OF UNCERTAIN BEHAVIOR OF SKIN: ICD-10-CM

## 2023-12-15 DIAGNOSIS — R23.9 UNSPECIFIED SKIN CHANGES: ICD-10-CM

## 2023-12-15 PROCEDURE — 88305 TISSUE EXAM BY PATHOLOGIST: CPT | Performed by: PLASTIC SURGERY

## 2025-02-18 ENCOUNTER — APPOINTMENT (OUTPATIENT)
Dept: CT IMAGING | Facility: HOSPITAL | Age: 65
End: 2025-02-18
Attending: EMERGENCY MEDICINE
Payer: MEDICARE

## 2025-02-18 ENCOUNTER — HOSPITAL ENCOUNTER (EMERGENCY)
Facility: HOSPITAL | Age: 65
Discharge: HOME | End: 2025-02-18
Attending: EMERGENCY MEDICINE
Payer: MEDICARE

## 2025-02-18 VITALS
TEMPERATURE: 98 F | OXYGEN SATURATION: 94 % | WEIGHT: 195 LBS | DIASTOLIC BLOOD PRESSURE: 85 MMHG | BODY MASS INDEX: 29.55 KG/M2 | HEIGHT: 68 IN | SYSTOLIC BLOOD PRESSURE: 135 MMHG | RESPIRATION RATE: 15 BRPM | HEART RATE: 65 BPM

## 2025-02-18 DIAGNOSIS — G44.209 TENSION HEADACHE: Primary | ICD-10-CM

## 2025-02-18 LAB
ANION GAP SERPL CALC-SCNC: 9 MMOL/L (ref 0–18)
ATRIAL RATE: 69 BPM
BASOPHILS # BLD AUTO: 0.02 X10(3) UL (ref 0–0.2)
BASOPHILS NFR BLD AUTO: 0.3 %
BUN BLD-MCNC: 16 MG/DL (ref 9–23)
BUN/CREAT SERPL: 16.7 (ref 10–20)
CALCIUM BLD-MCNC: 9.1 MG/DL (ref 8.7–10.4)
CHLORIDE SERPL-SCNC: 108 MMOL/L (ref 98–112)
CO2 SERPL-SCNC: 27 MMOL/L (ref 21–32)
CREAT BLD-MCNC: 0.96 MG/DL
DEPRECATED RDW RBC AUTO: 40.3 FL (ref 35.1–46.3)
EGFRCR SERPLBLD CKD-EPI 2021: 87 ML/MIN/1.73M2 (ref 60–?)
EOSINOPHIL # BLD AUTO: 0.01 X10(3) UL (ref 0–0.7)
EOSINOPHIL NFR BLD AUTO: 0.1 %
ERYTHROCYTE [DISTWIDTH] IN BLOOD BY AUTOMATED COUNT: 12.3 % (ref 11–15)
GLUCOSE BLD-MCNC: 102 MG/DL (ref 70–99)
HCT VFR BLD AUTO: 42.4 %
HGB BLD-MCNC: 15.3 G/DL
IMM GRANULOCYTES # BLD AUTO: 0.01 X10(3) UL (ref 0–1)
IMM GRANULOCYTES NFR BLD: 0.1 %
LYMPHOCYTES # BLD AUTO: 1.32 X10(3) UL (ref 1–4)
LYMPHOCYTES NFR BLD AUTO: 18.5 %
MCH RBC QN AUTO: 32.1 PG (ref 26–34)
MCHC RBC AUTO-ENTMCNC: 36.1 G/DL (ref 31–37)
MCV RBC AUTO: 88.9 FL
MONOCYTES # BLD AUTO: 0.28 X10(3) UL (ref 0.1–1)
MONOCYTES NFR BLD AUTO: 3.9 %
NEUTROPHILS # BLD AUTO: 5.5 X10 (3) UL (ref 1.5–7.7)
NEUTROPHILS # BLD AUTO: 5.5 X10(3) UL (ref 1.5–7.7)
NEUTROPHILS NFR BLD AUTO: 77.1 %
OSMOLALITY SERPL CALC.SUM OF ELEC: 299 MOSM/KG (ref 275–295)
P AXIS: 40 DEGREES
P-R INTERVAL: 166 MS
PLATELET # BLD AUTO: 260 10(3)UL (ref 150–450)
POTASSIUM SERPL-SCNC: 3.8 MMOL/L (ref 3.5–5.1)
Q-T INTERVAL: 408 MS
QRS DURATION: 96 MS
QTC CALCULATION (BEZET): 437 MS
R AXIS: -14 DEGREES
RBC # BLD AUTO: 4.77 X10(6)UL
SODIUM SERPL-SCNC: 144 MMOL/L (ref 136–145)
T AXIS: 29 DEGREES
VENTRICULAR RATE: 69 BPM
WBC # BLD AUTO: 7.1 X10(3) UL (ref 4–11)

## 2025-02-18 PROCEDURE — 85025 COMPLETE CBC W/AUTO DIFF WBC: CPT | Performed by: EMERGENCY MEDICINE

## 2025-02-18 PROCEDURE — 80048 BASIC METABOLIC PNL TOTAL CA: CPT | Performed by: EMERGENCY MEDICINE

## 2025-02-18 PROCEDURE — 99285 EMERGENCY DEPT VISIT HI MDM: CPT

## 2025-02-18 PROCEDURE — 93010 ELECTROCARDIOGRAM REPORT: CPT

## 2025-02-18 PROCEDURE — 99284 EMERGENCY DEPT VISIT MOD MDM: CPT

## 2025-02-18 PROCEDURE — 96374 THER/PROPH/DIAG INJ IV PUSH: CPT

## 2025-02-18 PROCEDURE — 93005 ELECTROCARDIOGRAM TRACING: CPT

## 2025-02-18 PROCEDURE — 70450 CT HEAD/BRAIN W/O DYE: CPT | Performed by: EMERGENCY MEDICINE

## 2025-02-18 RX ORDER — ONDANSETRON 2 MG/ML
4 INJECTION INTRAMUSCULAR; INTRAVENOUS ONCE
Status: COMPLETED | OUTPATIENT
Start: 2025-02-18 | End: 2025-02-18

## 2025-02-18 RX ORDER — ACETAMINOPHEN 500 MG
1000 TABLET ORAL ONCE
Status: COMPLETED | OUTPATIENT
Start: 2025-02-18 | End: 2025-02-18

## 2025-02-18 RX ORDER — ONDANSETRON 4 MG/1
4 TABLET, ORALLY DISINTEGRATING ORAL EVERY 4 HOURS PRN
Qty: 12 TABLET | Refills: 0 | Status: SHIPPED | OUTPATIENT
Start: 2025-02-18 | End: 2025-02-25

## 2025-02-18 NOTE — ED INITIAL ASSESSMENT (HPI)
Pt presents to the ER with c/o \"pop to mid back of head\" at 5:45AM. Pt states it was followed by n/v/d and dizziness. Pt states dizziness is worse with position changes.     Equal strength and sensation and to BUE and BLE

## 2025-02-19 NOTE — ED PROVIDER NOTES
Patient Seen in: [facility] Emergency Department    History     Chief Complaint   Patient presents with    Headache    Nausea/Vomiting/Diarrhea    Dizziness     Stated Complaint: Head pressure, vomiting, balance issues    HPI  Pt c/o a 4/10 headache that began this morning.  .  This is more rapid onset compared to previous headaches.  Pain is described as pressure . Headache is associated with n/v.  no fever, no stiff neck, no visual changes and no focal neuro deficit.      Past Medical History:    Cancer (HCC)    skin cancer    Esophageal reflux    Hearing impairment    Ringing in ears    High blood pressure    High cholesterol       Past Surgical History:   Procedure Laterality Date    Anesth,surgery of shoulder      Colonoscopy  2008, 2013    Colonoscopy      Colonoscopy N/A 12/31/2018    Procedure: COLONOSCOPY;  Surgeon: Eddie Mcbride MD;  Location: [facility] ENDOSCOPY    Colonoscopy N/A 12/12/2023    Procedure: COLONOSCOPY;  Surgeon: Eddie Mcbride MD;  Location: [facility] ENDOSCOPY    Egd      Knee surgery Right 2013    ACL            Family History   Problem Relation Age of Onset    Gastro-Intestinal Disorder Father         Stomach ulcer    Other (Other) Father 72        Multiple sclerosis    Heart Disease Mother     Cancer Mother         uterine cancer    Cancer Sister         breast cancer    Cancer Brother         Colon cancer    Cancer Maternal Grandmother         uterine cancer    Other (Other) Maternal Grandmother     Other (Other) Maternal Grandfather     Other (Other) Paternal Grandmother     Other (Other) Paternal Grandfather     Cancer Brother         Cancer-pancreatic    Cancer Sister         Lung cancer       Social History     Socioeconomic History    Marital status:    Tobacco Use    Smoking status: Never    Smokeless tobacco: Never   Substance and Sexual Activity    Alcohol use: Yes     Comment: Beer & wine, occasional    Drug use: No   Other Topics Concern    Caffeine  Concern Yes     Comment: Coffee, 1 cup daily     Social Drivers of Health     Food Insecurity: No Food Insecurity (2/15/2025)    Received from [facility]    Food Insecurity     Currently or in the past 3 months, have you worried your food would run out before you had money to buy more?: No     In the past 12 months, have you run out of food or been unable to get more?: No   Transportation Needs: No Transportation Needs (2/15/2025)    Received from [facility]    Transportation Needs     Currently or in the past 3 months, has lack of transportation kept you from medical appointments, getting food or medicine, or providing care to a family member?: Unrecognized value     Medical Transportation Needs?: No    Received from [facility]    Housing Stability       Review of Systems    Positive for stated complaint: Head pressure, vomiting, balance issues  Other systems are as noted in HPI.  Constitutional and vital signs reviewed.      All other systems reviewed and negative except as noted above.    [facility] and agreed except as otherwise stated in HPI.    Physical Exam     ED Triage Vitals [02/18/25 1245]   /85   Pulse 74   Resp 18   Temp 97.5 °F (36.4 °C)   Temp src Oral   SpO2 98 %   O2 Device None (Room air)       Current:/85   Pulse 65   Temp 97.5 °F (36.4 °C) (Oral)   Resp 15   Ht 172.7 cm (5' 8\")   Wt 88.5 kg   SpO2 94%   BMI 29.65 kg/m²   PULSE OX nl  Constitutional:  No acute distress  HEENT:  Head normocephalic and atraumatic. No scleral icterus or erythema. Pharynx moist without erythema or exudate.  CV:  Regular rate and rhythm. No murmur. Peripheral pulses intact.  Respiratory:  Lungs clear to auscultation bilaterally  Abdomen:  Soft, non-tender, non-distended.  Back:  No CVA or vertebral tenderness  Skin:  Normal color. Warm and Dry  Extremities:  Non-tender. No pedal edema.   Neuro:  Oriented x3.  PERRL,  EOMI. CN II - XII grossly intact.  No gross motor deficits.  5/5 strength in all distribution.  Sensation fully intact.      DDX to include tension headache vs. Migraine headache vs. Sinusitis vs. SAH        ED Course     Labs Reviewed   BASIC METABOLIC PANEL (8) - Abnormal; Notable for the following components:       Result Value    Glucose 102 (*)     Calculated Osmolality 299 (*)     All other components within normal limits   CBC WITH DIFFERENTIAL WITH PLATELET     EKG    Rate, intervals and axes as noted on EKG Report.  Rate: 69  Rhythm: Sinus Rhythm  Reading: Normal intervals, normal EKG             MDM     Medical Decision Making  Problems Addressed:  Tension headache: acute illness or injury    Amount and/or Complexity of Data Reviewed  Labs: ordered. Decision-making details documented in ED Course.  Radiology: ordered and independent interpretation performed. Decision-making details documented in ED Course.  ECG/medicine tests: ordered and independent interpretation performed. Decision-making details documented in ED Course.  Discussion of management or test interpretation with external provider(s): Tylenol, motrin recommended.      Risk  OTC drugs.  Prescription drug management.        I reviewed CT and noted no intracranial bleeding    Re-Exam: reviewed results will dc return for any concerning symptoms    Patient presenting with headache.  Patient  with no abnormal symptoms for patient.  Patient was discharged home. Patient advised to return to ER if alteration in mental status, onset of fevers, visual changes, or peripheral weakness/numbness/tingling.  Disposition and Plan     Clinical Impression:  1. Tension headache        Disposition:  Discharge    Follow-up:  Dharmesh Dewitt MD  [address] [facility]  2ND FLOOR  [facility] [id number]  [phone]    Follow up        Medications Prescribed:  Discharge Medication List as of 2/18/2025  2:03 PM        START taking these medications    Details   ondansetron 4  MG Oral Tablet Dispersible Take 1 tablet (4 mg total) by mouth every 4 (four) hours as needed for Nausea., Normal, Disp-12 tablet, R-0

## 2025-02-28 ENCOUNTER — APPOINTMENT (OUTPATIENT)
Dept: CT IMAGING | Facility: HOSPITAL | Age: 65
End: 2025-02-28
Attending: EMERGENCY MEDICINE
Payer: MEDICARE

## 2025-02-28 ENCOUNTER — HOSPITAL ENCOUNTER (EMERGENCY)
Facility: HOSPITAL | Age: 65
Discharge: HOME | End: 2025-02-28
Attending: EMERGENCY MEDICINE
Payer: MEDICARE

## 2025-02-28 ENCOUNTER — APPOINTMENT (OUTPATIENT)
Dept: GENERAL RADIOLOGY | Facility: HOSPITAL | Age: 65
End: 2025-02-28
Payer: MEDICARE

## 2025-02-28 VITALS
RESPIRATION RATE: 18 BRPM | HEART RATE: 63 BPM | BODY MASS INDEX: 29.55 KG/M2 | TEMPERATURE: 98 F | WEIGHT: 195 LBS | DIASTOLIC BLOOD PRESSURE: 75 MMHG | OXYGEN SATURATION: 99 % | HEIGHT: 68 IN | SYSTOLIC BLOOD PRESSURE: 107 MMHG

## 2025-02-28 DIAGNOSIS — R07.9 CHEST PAIN, UNSPECIFIED TYPE: Primary | ICD-10-CM

## 2025-02-28 LAB
ALBUMIN SERPL-MCNC: 4.4 G/DL (ref 3.2–4.8)
ALP LIVER SERPL-CCNC: 55 U/L
ALT SERPL-CCNC: 39 U/L
ANION GAP SERPL CALC-SCNC: 7 MMOL/L (ref 0–18)
AST SERPL-CCNC: 24 U/L (ref ?–34)
ATRIAL RATE: 54 BPM
BASOPHILS # BLD AUTO: 0.03 X10(3) UL (ref 0–0.2)
BASOPHILS NFR BLD AUTO: 0.4 %
BILIRUB DIRECT SERPL-MCNC: 0.2 MG/DL (ref ?–0.3)
BILIRUB SERPL-MCNC: 0.6 MG/DL (ref 0.2–1.1)
BUN BLD-MCNC: 11 MG/DL (ref 9–23)
BUN/CREAT SERPL: 10.1 (ref 10–20)
CALCIUM BLD-MCNC: 9.2 MG/DL (ref 8.7–10.4)
CHLORIDE SERPL-SCNC: 110 MMOL/L (ref 98–112)
CO2 SERPL-SCNC: 27 MMOL/L (ref 21–32)
CREAT BLD-MCNC: 1.09 MG/DL
DEPRECATED RDW RBC AUTO: 40.8 FL (ref 35.1–46.3)
EGFRCR SERPLBLD CKD-EPI 2021: 75 ML/MIN/1.73M2 (ref 60–?)
EOSINOPHIL # BLD AUTO: 0.14 X10(3) UL (ref 0–0.7)
EOSINOPHIL NFR BLD AUTO: 1.8 %
ERYTHROCYTE [DISTWIDTH] IN BLOOD BY AUTOMATED COUNT: 12.5 % (ref 11–15)
GLUCOSE BLD-MCNC: 127 MG/DL (ref 70–99)
HCT VFR BLD AUTO: 42 %
HGB BLD-MCNC: 14.7 G/DL
IMM GRANULOCYTES # BLD AUTO: 0.02 X10(3) UL (ref 0–1)
IMM GRANULOCYTES NFR BLD: 0.3 %
LIPASE SERPL-CCNC: 35 U/L (ref 12–53)
LYMPHOCYTES # BLD AUTO: 2.7 X10(3) UL (ref 1–4)
LYMPHOCYTES NFR BLD AUTO: 35.1 %
MCH RBC QN AUTO: 31.1 PG (ref 26–34)
MCHC RBC AUTO-ENTMCNC: 35 G/DL (ref 31–37)
MCV RBC AUTO: 88.8 FL
MONOCYTES # BLD AUTO: 0.54 X10(3) UL (ref 0.1–1)
MONOCYTES NFR BLD AUTO: 7 %
NEUTROPHILS # BLD AUTO: 4.27 X10 (3) UL (ref 1.5–7.7)
NEUTROPHILS # BLD AUTO: 4.27 X10(3) UL (ref 1.5–7.7)
NEUTROPHILS NFR BLD AUTO: 55.4 %
OSMOLALITY SERPL CALC.SUM OF ELEC: 299 MOSM/KG (ref 275–295)
P AXIS: 13 DEGREES
P-R INTERVAL: 158 MS
PLATELET # BLD AUTO: 270 10(3)UL (ref 150–450)
POTASSIUM SERPL-SCNC: 3.7 MMOL/L (ref 3.5–5.1)
PROT SERPL-MCNC: 6.7 G/DL (ref 5.7–8.2)
Q-T INTERVAL: 426 MS
QRS DURATION: 98 MS
QTC CALCULATION (BEZET): 403 MS
R AXIS: 16 DEGREES
RBC # BLD AUTO: 4.73 X10(6)UL
SODIUM SERPL-SCNC: 144 MMOL/L (ref 136–145)
T AXIS: 25 DEGREES
TROPONIN I SERPL HS-MCNC: <3 NG/L
TROPONIN I SERPL HS-MCNC: <3 NG/L
VENTRICULAR RATE: 54 BPM
WBC # BLD AUTO: 7.7 X10(3) UL (ref 4–11)

## 2025-02-28 PROCEDURE — 99285 EMERGENCY DEPT VISIT HI MDM: CPT

## 2025-02-28 PROCEDURE — 96360 HYDRATION IV INFUSION INIT: CPT

## 2025-02-28 PROCEDURE — 84484 ASSAY OF TROPONIN QUANT: CPT | Performed by: EMERGENCY MEDICINE

## 2025-02-28 PROCEDURE — 93010 ELECTROCARDIOGRAM REPORT: CPT

## 2025-02-28 PROCEDURE — 85025 COMPLETE CBC W/AUTO DIFF WBC: CPT

## 2025-02-28 PROCEDURE — 84484 ASSAY OF TROPONIN QUANT: CPT

## 2025-02-28 PROCEDURE — 80048 BASIC METABOLIC PNL TOTAL CA: CPT

## 2025-02-28 PROCEDURE — 80076 HEPATIC FUNCTION PANEL: CPT | Performed by: EMERGENCY MEDICINE

## 2025-02-28 PROCEDURE — 83690 ASSAY OF LIPASE: CPT | Performed by: EMERGENCY MEDICINE

## 2025-02-28 PROCEDURE — 71045 X-RAY EXAM CHEST 1 VIEW: CPT | Performed by: EMERGENCY MEDICINE

## 2025-02-28 PROCEDURE — 93005 ELECTROCARDIOGRAM TRACING: CPT

## 2025-02-28 PROCEDURE — 74175 CTA ABDOMEN W/CONTRAST: CPT | Performed by: EMERGENCY MEDICINE

## 2025-02-28 PROCEDURE — 96361 HYDRATE IV INFUSION ADD-ON: CPT

## 2025-02-28 PROCEDURE — 71275 CT ANGIOGRAPHY CHEST: CPT | Performed by: EMERGENCY MEDICINE

## 2025-02-28 PROCEDURE — 80048 BASIC METABOLIC PNL TOTAL CA: CPT | Performed by: EMERGENCY MEDICINE

## 2025-02-28 PROCEDURE — 85025 COMPLETE CBC W/AUTO DIFF WBC: CPT | Performed by: EMERGENCY MEDICINE

## 2025-02-28 RX ORDER — NITROGLYCERIN 0.4 MG/1
0.4 TABLET SUBLINGUAL EVERY 5 MIN PRN
Status: DISCONTINUED | OUTPATIENT
Start: 2025-02-28 | End: 2025-02-28

## 2025-02-28 RX ORDER — ASPIRIN 81 MG/1
324 TABLET, CHEWABLE ORAL ONCE
Status: COMPLETED | OUTPATIENT
Start: 2025-02-28 | End: 2025-02-28

## 2025-02-28 NOTE — ED PROVIDER NOTES
Patient Seen in: [facility]         EMERGENCY DEPARTMENT NOTE    Dictated. Voice Transcription software has been utilized for this dictation (the reader should be aware that typographical errors are possible with voice transcription software and to please contact the dictating physician if there are questions.)         History     Chief Complaint   Patient presents with    Chest Pain       There may be discrepancies from triage note.     HPI    History provided by patient and patient's wife.  64-year-old male complaining of left-sided chest pain which radiates to his back that started  1.5 hours prior to arrival.  Denies associated shortness of breath.  No alleviating or exacerbating factors.  Has never had this pain before.  Wife at bedside states that patient was here last week for dizziness and vertigo.  Was diagnosed with a migraine.   Review reveals that patient was in the ER 2/18/2025.  CT brain negative and was diagnosed with tension headache.    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  Is retired and denies any unusual stress.  Denies tobacco/drugs.  No personal history of ACS, PE, DVT.  Denies pleuritic pain    No fevers, chills, nausea, vomiting, diarrhea, constipation, cough, cold symptoms, urinary complaints.  No sob  Reports active pain at this time.  No headache, neck pain, neck stiffness, incontinence.  No changes in mentation, no changes in vision, no total/new extremity weakness, no total/new extremity paresthesia, no difficulty speaking.  Denies orthopnea, pnd, change in exercise tolerance limited by chest pain/sob , lower extremity edema/asymmetry.     .  History reviewed.   Past Medical History:    Cancer (HCC)    skin cancer    Esophageal reflux    Hearing impairment    Ringing in ears    High blood pressure    High cholesterol       History reviewed.   Past Surgical History:   Procedure Laterality Date    Anesth,surgery of shoulder      Colonoscopy  2008, 2013    Colonoscopy       Colonoscopy N/A 12/31/2018    Procedure: COLONOSCOPY;  Surgeon: Eddie Mcbride MD;  Location: [facility]    Colonoscopy N/A 12/12/2023    Procedure: COLONOSCOPY;  Surgeon: Eddie Mcbride MD;  Location: [facility]    Egd      Knee surgery Right 2013    ACL         Medications :  Prescriptions Prior to Admission[1]     Family History   Problem Relation Age of Onset    Gastro-Intestinal Disorder Father         Stomach ulcer    Other (Other) Father 72        Multiple sclerosis    Heart Disease Mother     Cancer Mother         uterine cancer    Cancer Sister         breast cancer    Cancer Brother         Colon cancer    Cancer Maternal Grandmother         uterine cancer    Other (Other) Maternal Grandmother     Other (Other) Maternal Grandfather     Other (Other) Paternal Grandmother     Other (Other) Paternal Grandfather     Cancer Brother         Cancer-pancreatic    Cancer Sister         Lung cancer       Smoking Status:   Social History     Socioeconomic History    Marital status:    Tobacco Use    Smoking status: Never    Smokeless tobacco: Never   Substance and Sexual Activity    Alcohol use: Yes     Comment: Beer & wine, occasional    Drug use: No   Other Topics Concern    Caffeine Concern Yes     Comment: Coffee, 1 cup daily       Review of Systems   Constitutional: Negative.  Negative for fever.   HENT: Negative.     Eyes: Negative.    Respiratory: Negative.  Negative for shortness of breath.    Cardiovascular:  Positive for chest pain.   Gastrointestinal: Negative.    Genitourinary: Negative.    Musculoskeletal: Negative.    Skin: Negative.    Neurological: Negative.    Endo/Heme/Allergies: Negative.    Psychiatric/Behavioral: Negative.     All other systems reviewed and are negative.    Pertinent positives as listed.  All other organ systems are reviewed and are negative.    Constitutional and vital signs reviewed.      Social History and Family History elements reviewed from  today, pertinent positives to the presenting problem noted.    Physical Exam     ED Triage Vitals [02/28/25 0544]   BP (!) 163/84   Pulse 51   Resp 18   Temp 98.2 °F (36.8 °C)   Temp src Temporal   SpO2 100 %   O2 Device None (Room air)       All measures to prevent infection transmission during my interaction with the patient were taken. The patient was already wearing a droplet mask on my arrival to the room. Personal protective equipment including droplet mask, eye protection, and gloves were worn throughout the duration of the exam.  Handwashing was performed prior to and after the exam.  Stethoscope and any equipment used during my examination was cleaned with super sani-cloth germicidal wipes following the exam.     Physical Exam  Vitals and nursing note reviewed.   Constitutional:       General: He is not in acute distress.     Appearance: He is not ill-appearing or toxic-appearing.   Cardiovascular:      Rate and Rhythm: Normal rate and regular rhythm.      Comments: Dorsalis pedis pulses 2+ bilaterally    Pulmonary:      Effort: Pulmonary effort is normal. No respiratory distress.      Breath sounds: No stridor. No wheezing, rhonchi or rales.   Chest:      Chest wall: No tenderness.   Abdominal:      General: There is no distension.      Palpations: Abdomen is soft.      Tenderness: There is no abdominal tenderness. There is no guarding or rebound.      Comments: Negative Lorenz sign, negative McBurney's point tenderness     Musculoskeletal:      Right lower leg: No edema.      Left lower leg: No edema.   Skin:     Capillary Refill: Capillary refill takes less than 2 seconds.   Neurological:      Mental Status: He is alert.      Comments: Gross motor and sensory function intact symmetrically and bilaterally to upper extremities and lower extremities.   Psychiatric:         Mood and Affect: Mood normal.         Behavior: Behavior normal.           Review of prior notes in Care everywhere/Epic performed by  myself:  Patient had a CT chest revealing bilateral pulmonary nodules in February 2021.  CT calcium score completed 2016, results not viewable  -Please see HPI      ED Course     If labs obtained, they are personally reviewed by myself:     Labs Reviewed   BASIC METABOLIC PANEL (8) - Abnormal; Notable for the following components:       Result Value    Glucose 127 (*)     Calculated Osmolality 299 (*)     All other components within normal limits   TROPONIN I HIGH SENSITIVITY - Normal   TROPONIN I HIGH SENSITIVITY - Normal   LIPASE - Normal   HEPATIC FUNCTION PANEL (7) - Normal   CBC WITH DIFFERENTIAL WITH PLATELET   RAINBOW DRAW LAVENDER   RAINBOW DRAW LIGHT GREEN   RAINBOW DRAW BLUE   RAINBOW DRAW GOLD       If radiologic studies ordered during today's ER visit, my independent interpretation are seen directly below.  This is awaiting the radiologist's final interpretation.  CTA chest, independent interpretation of radiologic study completed by myself and awaiting formal radiologist interpretation: No pneumothorax  Chest X-ray, independent interpretation completed by myself and awaiting formal radiology read:  No acute cardiopulmonary process, no obvious mass       Imaging Results read by radiology in ED: CTA CHEST+CTA ABDOMEN DISSECT SET (CPT=71275/59638)    Result Date: 2/28/2025  CONCLUSION:  1.  No acute pulmonary embolus to the subsegmental pulmonary artery level.  2.  Bilateral perihilar bronchial wall thickening compatible with a nonspecific bronchitis or asthma. 3.  CTA imaging also demonstrates:  No acute aortic injury; no dissection.  Gross patency of the thoracic and abdominal aorta without stenosis, occlusion, or dissection.  The major mesenteric vessels and renal arteries arise from the abdominal aorta are also patent without high-grade stenosis. 4.  Chronic mild compression deformity at T3 similar to 2021 imaging. 5.  Bilateral peripelvic renal cysts. 6.  Small fat containing umbilical hernia.    Dictated by (CST): Quintin Rosa MD on 2/28/2025 at 7:47 AM     Finalized by (CST): Quintin Rosa MD on 2/28/2025 at 7:53 AM          XR CHEST AP PORTABLE  (CPT=71045)    Result Date: 2/28/2025  CONCLUSION: No acute cardiopulmonary abnormality.    Dictated by (CST): Quintin Rosa MD on 2/28/2025 at 6:53 AM     Finalized by (CST): Quintin Rosa MD on 2/28/2025 at 6:53 AM               ED Medications Administered:   Medications   nitroglycerin (Nitrostat) SL tab 0.4 mg (0.4 mg Sublingual Given 2/28/25 0640)   aspirin chewable tab 324 mg (has no administration in time range)   sodium chloride 0.9 % IV bolus 500 mL (500 mL Intravenous New Bag 2/28/25 0649)   iopamidol 76% (ISOVUE-370) injection for power injector (90 mL Intravenous Given 2/28/25 0743)           Vitals:    02/28/25 0630 02/28/25 0645 02/28/25 0800 02/28/25 0845   BP: 130/86 119/74 106/72 107/75   Pulse: 63 66 62 63   Resp: 20 16 17 18   Temp:       TempSrc:       SpO2: 100% 91% 96% 99%   Weight:       Height:         *I personally reviewed and interpreted all ED vitals.    Pulse Ox interpretation by myself: 100%, Room air, Normal     Monitor Interpretation by myself:   normal sinus rhythm    If Ekg obtained during today's visit, it is independently interpreted by myself directly below:  EKG    Rate, intervals and axes as noted on EKG Report.  Rate: 54  Rhythm: Sinus Rhythm  Reading: no st elevation, no st depression, normal t waves                Medical Record Review: I personally reviewed available prior medical records for any recent pertinent discharge summaries, testing, and procedures and reviewed those reports.      [facility]     Medical decision making/ED Course:   64-year-old male with history of hypertension complaining of left-sided chest pain which radiates to back and upper abdomen.  Anxious appearing, neurologically vascularly intact.  Differential diagnosis includes atypical ACS versus CAD.  He denies worsening symptoms with exertion.  Status post 3  nitroglycerin, patient's pain improved to 6 out of 10 from 8 out of 10.  At 925, he denied any pain.  Thankfully his workup today has been stable.  Troponin x 2 negative.  EKG sinus rhythm.  Low heart score.  CTA chest/abdomen/pelvis negative for PE/dissection  He has no pleuritic pain  BMP appears stable.  CBC normal, LFTs/lipase normal.  No focal abdominal tenderness to suggest an acute intra-abdominal pathology.  CTA of abdomen/pelvis negative for acute pathology.    Admission discussed and patient states that he is open to discharge versus admission and will take cardiology recommendation.  Case discussed with cardiology team Carolyn Muller who discussed case with Cardiologist Dr. Valente, who states patient can be discharged and they will arrange close follow-up.  Will give full dose aspirin at this time.  They requested patient to take baby aspirin daily.  Cardiology recommends no nitroglycerin prescription at this time.  Patient to return to emergency department for any worsening symptoms.  Chest x-ray within normal limits.    ACS, dissection, PE, cardiac arrhythmia, pericardial effusion, CHF, among other life-threatening medical conditions considered and seems unlikely given patient's history, exam, and appearance.  Strict return instructions given.  Patient encouraged to follow-up with primary care provider in the next few days.  Advised to return to the emergency department for any worsening symptoms      Patient is non toxic appearing, is in no distress, hemodynamically stable.  Pt agrees and is aware of plan.   Smiling upon discharge        Differential Diagnosis:  as listed above in medical decision making.   *Please note that in the presenting to the emergency department, illness/injury that poses a threat to life or function is considered during this patient's initial evaluation.    The complexity of this visit is therefore inherently more complex given the need to consider life threatening pathology  prior to any other etiology for this patient's visit.    The differential diagnosis and medical decision above exemplify this rationale.       Medical Decision Making  Problems Addressed:  Chest pain, unspecified type: acute illness or injury    Amount and/or Complexity of Data Reviewed  Independent Historian: ann  External Data Reviewed: labs and notes.  Labs: ordered. Decision-making details documented in ED Course.  Radiology: ordered and independent interpretation performed. Decision-making details documented in ED Course.  Discussion of management or test interpretation with external provider(s): Cardiology clinician    Risk  Prescription drug management.  Decision regarding hospitalization.               Vitals:    02/28/25 0630 02/28/25 0645 02/28/25 0800 02/28/25 0845   BP: 130/86 119/74 106/72 107/75   Pulse: 63 66 62 63   Resp: 20 16 17 18   Temp:       TempSrc:       SpO2: 100% 91% 96% 99%   Weight:       Height:                 Complicating Factors: Significant medical problems that contribute to the complexity of this emergency room evaluation is listed above.    Condition upon leaving the department: Stable    Disposition and Plan     Clinical Impression:  1. Chest pain, unspecified type        Disposition:  There is no disposition on file for this visit.    Medications Prescribed:  Current Discharge Medication List          I have discussed the discharge plan with the patient and/or family or well wisher present in the room with the patient's permission.  They state that they understand and agree with the plan.  All questions regarding their care have been answered prior to discharge.  They are aware: Emergency Department is not intended to be a substitute for an effort to provide complete medical care. The imaging, if any, have often been interpreted on a preliminary basis pending final reading by the radiologist.  Instructed to return immediately to the ED if any changes or worsening of condition  should occur.  If patient's blood pressure was greater than 140/90 today, patient encouraged to have this blood pressure rechecked with primary MD and blood pressure education provided.                     [1] (Not in a hospital admission)

## 2025-02-28 NOTE — DISCHARGE INSTRUCTIONS
Return to emergency department for any worsening symptoms including but not limited to worsening chest pain, shortness of breath, worsening symptoms with exertion, lower extremity swelling, weakness, numbness, abdominal pain, etc. Please follow with your primary care provider in the next few days for reevaluation of your symptoms.  Cardiology was aware that you were here today and will arrange close follow-up  The Emergency Department is not intended to be a substitute for an effort to provide complete medical care. The imaging, if any, have often been interpreted on a preliminary basis pending final reading by the radiologist. If your blood pressure was greater than 140/90, please have this blood pressure rechecked by your primary care provider in the next several days.

## 2025-08-18 ENCOUNTER — TELEPHONE (OUTPATIENT)
Facility: CLINIC | Age: 65
End: 2025-08-18

## 2025-08-20 ENCOUNTER — OFFICE VISIT (OUTPATIENT)
Facility: CLINIC | Age: 65
End: 2025-08-20

## 2025-08-20 VITALS
DIASTOLIC BLOOD PRESSURE: 66 MMHG | SYSTOLIC BLOOD PRESSURE: 91 MMHG | HEIGHT: 68 IN | WEIGHT: 188 LBS | HEART RATE: 105 BPM | BODY MASS INDEX: 28.49 KG/M2

## 2025-08-20 DIAGNOSIS — Z86.0100 HISTORY OF COLON POLYPS: ICD-10-CM

## 2025-08-20 DIAGNOSIS — K21.9 GASTROESOPHAGEAL REFLUX DISEASE WITHOUT ESOPHAGITIS: Primary | ICD-10-CM

## 2025-08-20 DIAGNOSIS — Z80.0 FAMILY HISTORY OF COLON CANCER: ICD-10-CM

## 2025-08-20 PROCEDURE — 99213 OFFICE O/P EST LOW 20 MIN: CPT | Performed by: INTERNAL MEDICINE

## 2025-08-20 RX ORDER — ALPRAZOLAM 0.25 MG
0.25 TABLET ORAL NIGHTLY PRN
COMMUNITY
Start: 2022-12-05

## 2025-08-20 RX ORDER — TRIAMCINOLONE ACETONIDE 1 MG/G
OINTMENT TOPICAL
COMMUNITY
Start: 2025-08-19

## 2025-08-20 RX ORDER — ATORVASTATIN CALCIUM 20 MG/1
20 TABLET, FILM COATED ORAL NIGHTLY
COMMUNITY
Start: 2023-08-07

## (undated) DEVICE — ENDOSCOPY PACK - LOWER: Brand: MEDLINE INDUSTRIES, INC.

## (undated) DEVICE — KIT CLEAN ENDOKIT 1.1OZ GOWNX2

## (undated) DEVICE — SINGLE-USE POLYPECTOMY SNARE: Brand: CAPTIFLEX

## (undated) DEVICE — SNARE OPTMZ PLPCTM TRP

## (undated) DEVICE — MEDI-VAC NON-CONDUCTIVE SUCTION TUBING 6MM X 1.8M (6FT.) L: Brand: CARDINAL HEALTH

## (undated) DEVICE — Device: Brand: DUAL NARE NASAL CANNULAE FEMALE LUER CON 7FT O2 TUBE

## (undated) DEVICE — 60 ML SYRINGE REGULAR TIP: Brand: MONOJECT

## (undated) DEVICE — KIT ENDO ORCAPOD 160/180/190

## (undated) DEVICE — LINE MNTR ADLT SET O2 INTMD

## (undated) DEVICE — Device: Brand: DEFENDO AIR/WATER/SUCTION AND BIOPSY VALVE

## (undated) NOTE — MR AVS SNAPSHOT
[facility]  [address] [place] [address]  [phone]               Thank you for choosing us for your health care visit with Kamran Nation MD.  We are glad to serve you and happy to provide you with this summary of your vis RA FISH OIL 1000 MG Caps   Take 1,000 capsules by mouth daily. TURMERIC CURCUMIN OR   Take by mouth.           vitamin C 1000 MG Tabs   Take 1,000 mg by mouth daily. Vitamin D3 98142 units Caps   Take by mouth.                 Where Water is best for hydration Fast food. Eat at home when possible     Tips for increasing your physical activity – Adults who are physically active are less likely to develop some chronic diseases than adults who are inactive.      HOW TO GET STARTED: HOW

## (undated) NOTE — LETTER
Hospital Discharge Documentation  Please phone to schedule a hospital follow up appointment.     From: [address] Hospitalist's Office  Phone: [phone]    Patient discharged time/date: 2/15/2018 11:46 AM  Patient discharge disposition:  Home or Self (36.4 °C), temperature source Oral, resp. rate 18, height 5' 9\" (1.753 m), weight 190 lb (86.2 kg), SpO2 96 %. [RS.3]  General: No acute distress. Alert and oriented x 3. HEENT: Moist mucous membranes. EOM-I. PERRL  Neck: No lymphadenopathy. No JVD.  No c Discharge Condition:[RS.1] Stable[RS. 2]    Discharge Medications:[RS.1]      Discharge Medications      CONTINUE taking these medications      Instructions Prescription details   Acidophilus/Pectin Caps  Commonly known as:  PROBIOTIC      Take 1 capsule by

## (undated) NOTE — ED AVS SNAPSHOT
[facility] in [address] [address] [place]    Phone:  [phone]    Fax:  [address]   MRN: [id number]    Department:  [facility] in [place]   Date of Visit: FOREIGN BODY, SOFT TISSUE (NOT REMOVED) (ENGLISH)      Disclosure     Insurance plans vary and the physician(s) referred by the Immediate Care may not be covered by your plan.   It is possible that the physician may not participate in your health insurance IF THERE IS ANY CHANGE OR WORSENING OF YOUR CONDITION, CALL YOUR PRIMARY CARE PHYSICIAN AT ONCE OR GO TO THE EMERGENCY DEPARTMENT.     If you have been prescribed any medication(s), please fill your prescription right away and begin taking the medication(s) you to explore options for quitting.     - If you have concerns related to behavioral health issues or thoughts of harming yourself, contact [facility] at [phone].     - If you don’t have insurance, Carlotta Rock

## (undated) NOTE — LETTER
8/25/2017    Dear Gricelda Hernandez,     It has come to our attention that you have a Chest CT required test due in September. This test was ordered by Dr. Herminia Ferris. This test requires a prior authorization.  [facility] Department at [phone]

## (undated) NOTE — Clinical Note
[facility], [address] [facility]       05/02/2017        Patient: Aida Marker   YOB: 1960   Date of Visit: 5/2/2017       Dear Emilie Underwood:           As you know, Javier Florian is a 47-year-old male who I am now eval unremarkable with pupils equal round and reactive to light and accommodation. Neck without adenopathy, thyromegaly, JVD nor bruit. Lungs clear to auscultation and percussion. Cardiac regular rate and rhythm no murmur.  Abdomen nontender, without hepatosplen

## (undated) NOTE — Clinical Note
May 3, 2017         Hernán House MD  [address]      Patient: Kristie Calles   YOB: 1960   Date of Visit: 5/2/2017       Dear Shen Beams:           As you know, Ortiz Ornelas is a 10-year-old male who I am now eval unremarkable with pupils equal round and reactive to light and accommodation. Neck without adenopathy, thyromegaly, JVD nor bruit. Lungs clear to auscultation and percussion. Cardiac regular rate and rhythm no murmur.  Abdomen nontender, without hepatosplen

## (undated) NOTE — MR AVS SNAPSHOT
[facility] (formerly [facility])  [address] [place] [id number] [phone]               Thank you for choosing us for your health care visit with Bebe Lisa MD.  We are glad to serve you and happy to provide you with this summary of your visit. If you've recently had a stay at the Hospital you can access your discharge instructions in [employer] by going to Visits < Admission Summaries.  If you've been to the Emergency Department or your doctor's office, you can view your past visit information in My

## (undated) NOTE — LETTER
[address]  Authorization for Surgical Operation and Procedure                                                                                           I hereby authorize Oriana Richard MD, my physician and his/her assistants (if applicable), which may include medical students, residents, and/or fellows, to perform the following surgical operation/ procedure and administer such anesthesia as may be determined necessary by my physician: Operation/Procedure name (s) COLONOSCOPY on [address]   2. I recognize that during the surgical operation/procedure, unforeseen conditions may necessitate additional or different procedures than those listed above. I, therefore, further authorize and request that the above-named surgeon, assistants, or designees perform such procedures as are, in their judgment, necessary and desirable. 3.   My surgeon/physician has discussed prior to my surgery the potential benefits, risks and side effects of this procedure; the likelihood of achieving goals; and potential problems that might occur during recuperation. They also discussed reasonable alternatives to the procedure, including risks, benefits, and side effects related to the alternatives and risks related to not receiving this procedure. I have had all my questions answered and I acknowledge that no guarantee has been made as to the result that may be obtained. 4.   Should the need arise during my operation/procedure, which includes change of level of care prior to discharge, I also consent to the administration of blood and/or blood products. Further, I understand that despite careful testing and screening of blood or blood products by collecting agencies, I may still be subject to ill effects as a result of receiving a blood transfusion and/or blood products.   The following are some, but not all, of the potential risks that can occur: fever and allergic reactions, hemolytic reactions, transmission of diseases such as Hepatitis, AIDS and Cytomegalovirus (CMV) and fluid overload. In the event that I wish to have an autologous transfusion of my own blood, or a directed donor transfusion, I will discuss this with my physician. Check only if Refusing Blood or Blood Products  I understand refusal of blood or blood products as deemed necessary by my physician may have serious consequences to my condition to include possible death. I hereby assume responsibility for my refusal and release the hospital, its personnel, and my physicians from any responsibility for the consequences of my refusal.    o  Refuse   5. I authorize the use of any specimen, organs, tissues, body parts or foreign objects that may be removed from my body during the operation/procedure for diagnosis, research or teaching purposes and their subsequent disposal by hospital authorities. I also authorize the release of specimen test results and/or written reports to my treating physician on the hospital medical staff or other referring or consulting physicians involved in my care, at the discretion of the Pathologist or my treating physician. 6.   I consent to the photographing or videotaping of the operations or procedures to be performed, including appropriate portions of my body for medical, scientific, or educational purposes, provided my identity is not revealed by the pictures or by descriptive texts accompanying them. If the procedure has been photographed/videotaped, the surgeon will obtain the original picture, image, videotape or CD. The hospital will not be responsible for storage, release or maintenance of the picture, image, tape or CD.    7.   I consent to the presence of a  or observers in the operating room as deemed necessary by my physician or their designees.     8.   I recognize that in the event my procedure results in extended X-Ray/fluoroscopy time, I may develop a skin reaction. 9. If I have a Do Not Attempt Resuscitation (DNAR) order in place, that status will be suspended while in the operating room, procedural suite, and during the recovery period unless otherwise explicitly stated by me (or a person authorized to consent on my behalf). The surgeon or my attending physician will determine when the applicable recovery period ends for purposes of reinstating the DNAR order. 10. Patients having a sterilization procedure: I understand that if the procedure is successful the results will be permanent and it will therefore be impossible for me to inseminate, conceive, or bear children. I also understand that the procedure is intended to result in sterility, although the result has not been guaranteed. 11. I acknowledge that my physician has explained sedation/analgesia administration to me including the risk and benefits I consent to the administration of sedation/analgesia as may be necessary or desirable in the judgment of my physician. I CERTIFY THAT I HAVE READ AND FULLY UNDERSTAND THE ABOVE CONSENT TO OPERATION and/or OTHER PROCEDURE.     _________________________________________ _________________________________     ___________________________________  Signature of Patient     Signature of Responsible Person                   Printed Name of Responsible Person                              _________________________________________ ______________________________        ___________________________________  Signature of Witness         Date  Time         Relationship to Patient    STATEMENT OF PHYSICIAN My signature below affirms that prior to the time of the procedure; I have explained to the patient and/or his/her legal representative, the risks and benefits involved in the proposed treatment and any reasonable alternative to the proposed treatment.  I have also explained the risks and benefits involved in refusal of the proposed treatment and alternatives to the proposed treatment and have answered the patient's questions.  If I have a significant financial interest in a co-management agreement or a significant financial interest in any product or implant, or other significant relationship used in this procedure/surgery, I have disclosed this and had a discussion with my patient.     _______________________________________________________________ _____________________________  Bijal Espinal Physician)                                                                                         (Date)                                   (Time)  Patient Name: Ender Cerda    : 1960   Printed: 2023      Medical Record #: [id number]                                              Page 1 of 1